# Patient Record
Sex: MALE | Race: BLACK OR AFRICAN AMERICAN | NOT HISPANIC OR LATINO
[De-identification: names, ages, dates, MRNs, and addresses within clinical notes are randomized per-mention and may not be internally consistent; named-entity substitution may affect disease eponyms.]

---

## 2019-08-26 ENCOUNTER — APPOINTMENT (OUTPATIENT)
Dept: NEUROLOGY | Facility: CLINIC | Age: 39
End: 2019-08-26

## 2019-09-11 ENCOUNTER — INPATIENT (INPATIENT)
Facility: HOSPITAL | Age: 39
LOS: 1 days | Discharge: HOME | DRG: 101 | End: 2019-09-13
Attending: PSYCHIATRY & NEUROLOGY | Admitting: PSYCHIATRY & NEUROLOGY
Payer: COMMERCIAL

## 2019-09-11 ENCOUNTER — APPOINTMENT (OUTPATIENT)
Dept: NEUROLOGY | Facility: CLINIC | Age: 39
End: 2019-09-11

## 2019-09-11 VITALS
WEIGHT: 315 LBS | SYSTOLIC BLOOD PRESSURE: 123 MMHG | RESPIRATION RATE: 18 BRPM | HEIGHT: 71 IN | HEART RATE: 86 BPM | TEMPERATURE: 98 F | DIASTOLIC BLOOD PRESSURE: 92 MMHG | OXYGEN SATURATION: 98 %

## 2019-09-11 DIAGNOSIS — R56.9 UNSPECIFIED CONVULSIONS: ICD-10-CM

## 2019-09-11 LAB
ALBUMIN SERPL ELPH-MCNC: 4.2 G/DL — SIGNIFICANT CHANGE UP (ref 3.3–5)
ALP SERPL-CCNC: 45 U/L — SIGNIFICANT CHANGE UP (ref 40–120)
ALT FLD-CCNC: 31 U/L — SIGNIFICANT CHANGE UP (ref 10–45)
ANION GAP SERPL CALC-SCNC: 11 MMOL/L — SIGNIFICANT CHANGE UP (ref 5–17)
AST SERPL-CCNC: 24 U/L — SIGNIFICANT CHANGE UP (ref 10–40)
BASOPHILS # BLD AUTO: 0.1 K/UL — SIGNIFICANT CHANGE UP (ref 0–0.2)
BASOPHILS NFR BLD AUTO: 1 % — SIGNIFICANT CHANGE UP (ref 0–2)
BILIRUB SERPL-MCNC: 0.2 MG/DL — SIGNIFICANT CHANGE UP (ref 0.2–1.2)
BUN SERPL-MCNC: 11 MG/DL — SIGNIFICANT CHANGE UP (ref 7–23)
CALCIUM SERPL-MCNC: 9.9 MG/DL — SIGNIFICANT CHANGE UP (ref 8.4–10.5)
CHLORIDE SERPL-SCNC: 103 MMOL/L — SIGNIFICANT CHANGE UP (ref 96–108)
CO2 SERPL-SCNC: 24 MMOL/L — SIGNIFICANT CHANGE UP (ref 22–31)
CREAT SERPL-MCNC: 0.99 MG/DL — SIGNIFICANT CHANGE UP (ref 0.5–1.3)
EOSINOPHIL # BLD AUTO: 0.1 K/UL — SIGNIFICANT CHANGE UP (ref 0–0.5)
EOSINOPHIL NFR BLD AUTO: 0.8 % — SIGNIFICANT CHANGE UP (ref 0–6)
GLUCOSE SERPL-MCNC: 100 MG/DL — HIGH (ref 70–99)
HCT VFR BLD CALC: 42.5 % — SIGNIFICANT CHANGE UP (ref 39–50)
HGB BLD-MCNC: 13.8 G/DL — SIGNIFICANT CHANGE UP (ref 13–17)
LYMPHOCYTES # BLD AUTO: 2.2 K/UL — SIGNIFICANT CHANGE UP (ref 1–3.3)
LYMPHOCYTES # BLD AUTO: 27.6 % — SIGNIFICANT CHANGE UP (ref 13–44)
MCHC RBC-ENTMCNC: 27.5 PG — SIGNIFICANT CHANGE UP (ref 27–34)
MCHC RBC-ENTMCNC: 32.5 GM/DL — SIGNIFICANT CHANGE UP (ref 32–36)
MCV RBC AUTO: 84.5 FL — SIGNIFICANT CHANGE UP (ref 80–100)
MONOCYTES # BLD AUTO: 0.6 K/UL — SIGNIFICANT CHANGE UP (ref 0–0.9)
MONOCYTES NFR BLD AUTO: 7.9 % — SIGNIFICANT CHANGE UP (ref 2–14)
NEUTROPHILS # BLD AUTO: 4.9 K/UL — SIGNIFICANT CHANGE UP (ref 1.8–7.4)
NEUTROPHILS NFR BLD AUTO: 62.7 % — SIGNIFICANT CHANGE UP (ref 43–77)
PLATELET # BLD AUTO: 252 K/UL — SIGNIFICANT CHANGE UP (ref 150–400)
POTASSIUM SERPL-MCNC: 4.3 MMOL/L — SIGNIFICANT CHANGE UP (ref 3.5–5.3)
POTASSIUM SERPL-SCNC: 4.3 MMOL/L — SIGNIFICANT CHANGE UP (ref 3.5–5.3)
PROT SERPL-MCNC: 7 G/DL — SIGNIFICANT CHANGE UP (ref 6–8.3)
RBC # BLD: 5.03 M/UL — SIGNIFICANT CHANGE UP (ref 4.2–5.8)
RBC # FLD: 13.3 % — SIGNIFICANT CHANGE UP (ref 10.3–14.5)
SODIUM SERPL-SCNC: 138 MMOL/L — SIGNIFICANT CHANGE UP (ref 135–145)
WBC # BLD: 7.8 K/UL — SIGNIFICANT CHANGE UP (ref 3.8–10.5)
WBC # FLD AUTO: 7.8 K/UL — SIGNIFICANT CHANGE UP (ref 3.8–10.5)

## 2019-09-11 PROCEDURE — 95816 EEG AWAKE AND DROWSY: CPT | Mod: 26

## 2019-09-11 PROCEDURE — 99285 EMERGENCY DEPT VISIT HI MDM: CPT

## 2019-09-11 PROCEDURE — 73030 X-RAY EXAM OF SHOULDER: CPT | Mod: 26,RT

## 2019-09-11 PROCEDURE — 99223 1ST HOSP IP/OBS HIGH 75: CPT

## 2019-09-11 RX ORDER — ACETAMINOPHEN 500 MG
975 TABLET ORAL ONCE
Refills: 0 | Status: COMPLETED | OUTPATIENT
Start: 2019-09-11 | End: 2019-09-12

## 2019-09-11 RX ORDER — ENOXAPARIN SODIUM 100 MG/ML
40 INJECTION SUBCUTANEOUS DAILY
Refills: 0 | Status: DISCONTINUED | OUTPATIENT
Start: 2019-09-11 | End: 2019-09-13

## 2019-09-11 NOTE — ED ADULT NURSE REASSESSMENT NOTE - NS ED NURSE REASSESS COMMENT FT1
Order received for tylenol, pt states HA is currently improving, does not want tylenol at this time.

## 2019-09-11 NOTE — H&P ADULT - ASSESSMENT
Assessment: 39 year old M with a PMH of a two seizures in August presents with a witnessed seizure that occurred at 2:15 am on 9/11.  Physical exam significant for left lateral tongue lacerations.     Impression: Right head tilt and right gaze deviation secondary to focal seizure with secondary generalization likely due to left brain dysfunction    Plan:  Imaging:  MRI brain epilepsy protocol if cannot obtain the MRI by 12 pm on 9/11, then CTH w/o contrast should be performed.    Labs:  CBC, CMP, Magnesium, Urine/serum toxicology    Medications:  Ativan 2 mg prn for seizure >3 minutes  Consider starting AED    Other:  VEEG monitoring  Right shoulder X-ray  Telemetry monitoring  Patient would benefit from sleep study outpatient    Disposition:   Admit to EMU    Case discussed with epilepsy attending, Dr. Clayton Assessment: 39 year old M with a PMH of a two seizures in August presents with a witnessed seizure that occurred at 2:15 am on 9/11.  Physical exam significant for left lateral tongue lacerations.     Impression: Right head tilt and right gaze deviation with generalized tonic clonic movements of upper/lower extremities secondary to focal seizure with secondary generalization likely due to left brain dysfunction    Plan:  Imaging:  MRI brain epilepsy protocol if cannot obtain the MRI by 12 pm on 9/11, then CTH w/o contrast should be performed.    Labs:  CBC, CMP, Magnesium, Urine/serum toxicology    Medications:  Ativan 2 mg prn for seizure >3 minutes  Consider starting AED    Other:  VEEG monitoring  Right shoulder X-ray  Telemetry monitoring  Patient would benefit from sleep study outpatient    Disposition:   Admit to EMU    Case discussed with epilepsy attending, Dr. Clayton Assessment: 39 year old M with a PMH of a two seizures in August presents with a witnessed seizure that occurred at 2:15 am on 9/11.  Physical exam significant for left lateral tongue lacerations.     Impression: Right head tilt and right gaze deviation with generalized tonic clonic movements of upper/lower extremities secondary to focal seizure with secondary generalization likely due to left brain dysfunction    Plan:  Imaging:  MRI brain epilepsy protocol if cannot obtain the MRI by 12 pm on 9/11, then CTH w/o contrast should be performed.    Labs:  CBC, CMP, Magnesium, Urine/serum toxicology    Medications:  Ativan 2 mg prn for seizure >3 minutes  Consider starting AED  DVT ppx    Other:  VEEG monitoring  Right shoulder X-ray  SLP consult for dysphagia  Telemetry monitoring  Patient would benefit from sleep study outpatient    Disposition:   Admit to EMU    Case discussed with epilepsy attending, Dr. Clayton

## 2019-09-11 NOTE — H&P ADULT - NSHPLABSRESULTS_GEN_ALL_CORE
LABORATORY:  CBC                       13.8   7.8   )-----------( 252      ( 11 Sep 2019 06:14 )             42.5     Chem 09-11    138  |  103  |  11  ----------------------------<  100<H>  4.3   |  24  |  0.99    Ca    9.9      11 Sep 2019 06:14    TPro  7.0  /  Alb  4.2  /  TBili  0.2  /  DBili  x   /  AST  24  /  ALT  31  /  AlkPhos  45  09-11    LFTs LIVER FUNCTIONS - ( 11 Sep 2019 06:14 )  Alb: 4.2 g/dL / Pro: 7.0 g/dL / ALK PHOS: 45 U/L / ALT: 31 U/L / AST: 24 U/L / GGT: x           Coagulopathy   Lipid Panel   A1c   Cardiac enzymes     U/A   CSF  Immunological  Other    STUDIES & IMAGING:  Studies (EKG, EEG, EMG, etc):     Radiology (XR, CT, MR, U/S, TTE/ALENA):

## 2019-09-11 NOTE — ED PROVIDER NOTE - ATTENDING CONTRIBUTION TO CARE
Patient is a 40 yo M here for evaluation of witnessed seizure. Patient reports a first time seizure on 8/16/19 with subsequent evaluation at [facility] and neurology follow up. He states he had a CT Head which was negative, followed with neurology and had outpatient testing scheduled. He has an EEG scheduled for 11 AM today. Patient's partner has a video of his seizure, seen to be having jerking movements. + tongue biting and urinary incontinence. Denies fevers, chills, vomiting. He was not started on seizure medication. He reports being very tired last night, possibly not sleeping enough.     VS noted  Gen. no acute distress, Non toxic   HEENT: EOMI, PERRL, mmm, tongue bite to lateral left tongue, pharynx w/o erythema, atraumatic  Lungs: CTAB/L no C/ W /R   CVS: RRR   Abd; Soft non tender, non distended   Ext: no edema  Skin: no rash  Neuro AAOx3, CN 2-12 intact, strength equal in UE/LE, gait normal, non focal clear speech  a/p: seizure, not on medications. Plan for labs, neurology evaluation.   - Jazmyne MARK

## 2019-09-11 NOTE — H&P ADULT - NSHPSOCIALHISTORY_GEN_ALL_CORE
SOCIAL HISTORY:   T/E/D: denies tobacco use, drinks alcohol socially   Occupation: not currently employed.

## 2019-09-11 NOTE — ED ADULT NURSE NOTE - CHPI ED NUR SYMPTOMS NEG
no blurred vision/no vomiting/no fever/no nausea/no numbness/no weakness/no loss of consciousness/no change in level of consciousness/no confusion/no dizziness

## 2019-09-11 NOTE — ED PROVIDER NOTE - CLINICAL SUMMARY MEDICAL DECISION MAKING FREE TEXT BOX
Pt p/w seizure, had first time seizure last mo., currently returned to baseline, pt scheduled for EEG o/p today so will not give antiepileptic load at this time. No indicated for CT at this time, pt had normal CT last month. Likely recommed dc for o/p EEG scheduled today -Triston Pt p/w seizure, had first time seizure last mo., currently returned to baseline, pt scheduled for EEG o/p today so will not give antiepileptic load at this time. No indicated for CT at this time, pt had normal CT last month. Seen by neurology who recommended admission.

## 2019-09-11 NOTE — ED ADULT NURSE NOTE - CHIEF COMPLAINT QUOTE
Pt c/o seizure.  Pt has seizure at 215 AM while in house, girlfriend witnessed.  pt had first seizure 8/16/19, scheduled for EEG today.  Pt bit his tongue and urinated himself.

## 2019-09-11 NOTE — ED ADULT NURSE NOTE - NSIMPLEMENTINTERV_GEN_ALL_ED
Implemented All Universal Safety Interventions:  [place] to call system. Call bell, personal items and telephone within reach. Instruct patient to call for assistance. Room bathroom lighting operational. Non-slip footwear when patient is off stretcher. Physically safe environment: no spills, clutter or unnecessary equipment. Stretcher in lowest position, wheels locked, appropriate side rails in place.

## 2019-09-11 NOTE — ED PROVIDER NOTE - PROGRESS NOTE DETAILS
Triston, pgy3: pt requesting neuro consult for eval Gaurav MARK: neurology at bedside evaluation patient. Gaurav MARK: neurology at bedside evaluating patient. Corinne MARK: case discussed with neurology who accepts patient for admission.

## 2019-09-11 NOTE — ED ADULT NURSE NOTE - OBJECTIVE STATEMENT
38 yo M presents to ED from home c/o seizure. Pt girlfriend at bedside states she witnessed seizure, pt was sleeping, noted to be "shaking violently in his sleep". Pt reportedly bit his tongue and was incontinent of urine during episode. Girlfriend states seizure lasted approx 1 minute, pt was postictal for approx 20-25 mins after. Pt currently states he feels "okay but sleepy". Girlfriend denies pt falling or hitting head during event. PERRL. Denies vision changes. Pt had seizure for first time 8/2019, scheduled at 11am today for EEG out pt. Pt denies any CP, SOB, N/V, fever, chills, urinary complaints, constipation, diarrhea, HA, dizziness, weakness. Pt A&Ox4, lungs CTA, +central pulses. Abdomen soft, not tender, not distended. Ambulating w/ steady gait, safety and comfort maintained, no acute distress noted at this time.

## 2019-09-11 NOTE — ED PROVIDER NOTE - OBJECTIVE STATEMENT
39y m PMhx seizures, first seizure on 8/16/19, received normal CT scan at the time and scheduled for EEG today a 11AM, p/w witnessed seizure while sleeping. Pt seen by girlfriend jerking violently in his sleep. Pt now awake and fully A&O, states only he "feels drowsy". Pt denies n/v/d, denies fevers/chills, denies recent illness, denies head trauma.

## 2019-09-11 NOTE — H&P ADULT - NSHPREVIEWOFSYSTEMS_GEN_ALL_CORE
REVIEW OF SYSTEMS    A 10-system ROS was performed and is negative except for those items noted above and/or in the HPI.

## 2019-09-11 NOTE — H&P ADULT - ATTENDING COMMENTS
agree with history as above.  in summary overweight patient with second nocturnal convulsive seizure in 3 weeks. ay very high risk of seizure recurrence/ SUDEP.  plan: admit to EMU  Ativan prn for seizures longer then 3 min  VEEG monitoring  head CT normal

## 2019-09-11 NOTE — ED ADULT NURSE REASSESSMENT NOTE - NS ED NURSE REASSESS COMMENT FT1
Received report from previous shift RN. Patient resting comfortably in bed, reporting improvement in HA. Patient denies current change in vision, numbness, tingling, weakness, n/v. Patient resting comfortably with no acute distress noted, aware of plan of care for neuro eval. Received report from previous shift RN. Patient resting comfortably in bed, reporting improvement in HA. Patient denies current change in vision, numbness, tingling, weakness, n/v. Patient resting comfortably with no acute distress noted, aware of plan of care for neuro eval. Per girlfriend at bedside, patient has EEG appointment at 11am in [place]. MD Nolasco aware of plan for apt today and will reach out to neuro

## 2019-09-11 NOTE — H&P ADULT - HISTORY OF PRESENT ILLNESS
HPI: 39 year old M with a PMH of a two seizures in August presents with a witnessed seizure that occurred at 2:15 am on 9/11. Patient's significant other witnessed the episode and recorded a video as per the patient's neurologist's instructions and called 911. The episode was preceded by a grunting sound and was described as shaking of the upper and lower extremities with right gaze deviation and right head tilt that lasted for about 1 minute. The witness noticed that the patient had blood in his mouth and had urinary incontinence. She said that the patient did not respond to verbal stimuli for 25 minutes after the shaking stopped. When EMS arrived the patient was unable to say the date and was conversational, but unable to hold an appropriate conversation. The patient does not remember the event. The patient stated that on 9/10 he was feeling more tired than usual. The patient's first seizure was also witnessed by his significant other and it occurred on August 16 at 3:30 am. It lasted for one minute and had a similar presentation. It was then followed by a second seizure five minutes later. He was hitting his head on the nightstand and eventually fell of the bed. He has had right shoulder pain since then. The patient had chest tightness that preceded the seizure in August as well as the seizure that occurred on 9/11. He states that his right shoulder has some pain.  He denies alcohol or illicit drug use. He has dysphagia, odynophagia, and numbness/tingling when laying on left side. He denies head trauma during the most recent episode, headache, recent illness, or vision changes. He denies staring spells, or change in taste or smell. His neurologist is Dr. Spencer from [facility] and he was supposed to have a routine EEG today and was planning on getting an MRI and sleep study. Patient's neurologist believes that the patient may have sleep apnea, which could be a trigger for the seizures. Patient's mother had multiple seizure episodes from when she was an infant until she was 12 years old

## 2019-09-11 NOTE — H&P ADULT - NSHPPHYSICALEXAM_GEN_ALL_CORE
VITALS & EXAMINATION:  Vital Signs Last 24 Hrs  T(C): 36.8 (11 Sep 2019 04:18), Max: 36.8 (11 Sep 2019 04:18)  T(F): 98.2 (11 Sep 2019 04:18), Max: 98.2 (11 Sep 2019 04:18)  HR: 68 (11 Sep 2019 06:14) (68 - 86)  BP: 133/88 (11 Sep 2019 06:14) (123/92 - 133/88)  BP(mean): --  RR: 16 (11 Sep 2019 06:14) (16 - 18)  SpO2: 98% (11 Sep 2019 06:14) (98% - 98%)    General:  Constitutional: Obese Male, appears stated age, in no apparent distress including pain    Neurological (>12):  MS: Awake, appears somnolent initially but alert as exam progressed, oriented to person, place, situation, time. Normal affect. Follows all commands.    Language: Speech is clear, fluent with good repetition & comprehension (able to name objects)    CNs: PERRL (R = 3mm, L = 3mm). VFF. EOMI no nystagmus, no diplopia. V1-3 intact to LT, No facial asymmetry b/l, full eye closure strength b/l. Hearing grossly normal (rubbing fingers) b/l. Symmetric palate elevation in midline. Gag reflex deferred. Head turning & shoulder shrug intact b/l. Tongue midline, normal movements, no atrophy. Left lateral tongue has lacerations    Motor: Normal muscle bulk & tone. No noticeable tremor or seizure. No pronator drift.              Deltoid	Biceps	Triceps	Wrist	Finger ABd	   R	5	5	5	5	5		5 	  L	5	5	5	5	5		5    	H-Flex	H-Ext	H-ABd	H-ADd	K-Flex	K-Ext	D-Flex	P-Flex  R	5	5	5	5	5	5	5	5 	   L	5	5	5	5	5	5	5	5	     Sensation: Intact to LT b/l throughout.     Cortical: Extinction on DSS (neglect): none  Reflexes:              Biceps(C5)       BR(C6)     Triceps(C7)               Patellar(L4)    Achilles(S1)    Plantar Resp  R	2	          2	             2		        3		    2		Down   L	2	          2	             2		        3		    2		Down     Coordination: No dysmetria to FTN/HTS    Gait: No postural instability. Normal stance and gait.

## 2019-09-11 NOTE — ED ADULT TRIAGE NOTE - CHIEF COMPLAINT QUOTE
Pt c/o seizure.  Pt has seizure at 215 AM while in house, girlfriend witnessed.  pt had first seizure 8/16.  Pt bit his tongue and urinated himself. Pt c/o seizure.  Pt has seizure at 215 AM while in house, girlfriend witnessed.  pt had first seizure 8/16/19, scheduled for EEG today.  Pt bit his tongue and urinated himself.

## 2019-09-12 DIAGNOSIS — R56.9 UNSPECIFIED CONVULSIONS: ICD-10-CM

## 2019-09-12 PROCEDURE — 70551 MRI BRAIN STEM W/O DYE: CPT | Mod: 26

## 2019-09-12 PROCEDURE — 95951: CPT | Mod: 26

## 2019-09-12 PROCEDURE — 93010 ELECTROCARDIOGRAM REPORT: CPT

## 2019-09-12 PROCEDURE — 99233 SBSQ HOSP IP/OBS HIGH 50: CPT

## 2019-09-12 RX ADMIN — Medication 975 MILLIGRAM(S): at 21:13

## 2019-09-12 RX ADMIN — ENOXAPARIN SODIUM 40 MILLIGRAM(S): 100 INJECTION SUBCUTANEOUS at 12:01

## 2019-09-12 RX ADMIN — Medication 975 MILLIGRAM(S): at 21:52

## 2019-09-12 NOTE — CONSULT NOTE ADULT - SUBJECTIVE AND OBJECTIVE BOX
CHIEF COMPLAINT: Seizures     HISTORY OF PRESENT ILLNESS:  39 year old M with a PMH of a two seizures in August presents with a witnessed seizure that occurred at 2:15 am on 9/11. Patient's significant other witnessed the episode and recorded a video as per the patient's neurologist's instructions and called 911. The episode was preceded by a grunting sound and was described as shaking of the upper and lower extremities with right gaze deviation and right head tilt that lasted for about 1 minute. The witness noticed that the patient had blood in his mouth and had urinary incontinence. She said that the patient did not respond to verbal stimuli for 25 minutes after the shaking stopped. When EMS arrived the patient was unable to say the date and was conversational, but unable to hold an appropriate conversation. The patient does not remember the event. The patient stated that on 9/10 he was feeling more tired than usual. The patient's first seizure was also witnessed by his significant other and it occurred on August 16 at 3:30 am. It lasted for one minute and had a similar presentation. It was then followed by a second seizure five minutes later. He was hitting his head on the nightstand and eventually fell of the bed. He has had right shoulder pain since then. The patient had chest tightness that preceded the seizure in August as well as the seizure that occurred on 9/11. He states that his right shoulder has some pain.  He denies alcohol or illicit drug use. He has dysphagia, odynophagia, and numbness/tingling when laying on left side. He denies head trauma during the most recent episode, headache, recent illness, or vision changes. He denies staring spells, or change in taste or smell. His neurologist is Dr. Spencer from [facility] and he was supposed to have a routine EEG today and was planning on getting an MRI and sleep study. Patient's neurologist believes that the patient may have sleep apnea, which could be a trigger for the seizures. Patient's mother had multiple seizure episodes from when she was an infant until she was 12 years old    Found to have possible Afib on Tele overnight.   No known history of cardiac disease. NO chest pain, shortness of breath or palpitations.     PAST MEDICAL & SURGICAL HISTORY:          MEDICATIONS:  enoxaparin Injectable 40 milliGRAM(s) SubCutaneous daily        acetaminophen   Tablet .. 975 milliGRAM(s) Oral once  LORazepam   Injectable 2 milliGRAM(s) IV Push once PRN            FAMILY HISTORY:      SOCIAL HISTORY:    [ ] Non-smoker  [ ] Smoker  [ ] Alcohol    Allergies    Drug Allergies Not Recorded  pt allergic to grapefruit. (Anaphylaxis)    Intolerances    	    REVIEW OF SYSTEMS:  CONSTITUTIONAL: No fever, weight loss, + fatigue  EYES: No eye pain, visual disturbances, or discharge  ENMT:  No difficulty hearing, tinnitus, vertigo; No sinus or throat pain  NECK: No pain or stiffness  RESPIRATORY: No cough, wheezing, chills or hemoptysis; No Shortness of Breath  CARDIOVASCULAR: No chest pain, palpitations, passing out, dizziness, or leg swelling  GASTROINTESTINAL: No abdominal or epigastric pain. No nausea, vomiting, or hematemesis; No diarrhea or constipation. No melena or hematochezia.  GENITOURINARY: No dysuria, frequency, hematuria, or incontinence  NEUROLOGICAL: No headaches, memory loss, loss of strength, numbness, or tremors  SKIN: No itching, burning, rashes, or lesions   LYMPH Nodes: No enlarged glands  ENDOCRINE: No heat or cold intolerance; No hair loss  MUSCULOSKELETAL: No joint pain or swelling; No muscle, back, or extremity pain  PSYCHIATRIC: No depression, anxiety, mood swings, or difficulty sleeping  HEME/LYMPH: No easy bruising, or bleeding gums  ALLERY AND IMMUNOLOGIC: No hives or eczema	    [ ] All others negative	  [ ] Unable to obtain    PHYSICAL EXAM:  T(C): 36.8 (09-12-19 @ 10:00), Max: 37.2 (09-11-19 @ 23:30)  HR: 70 (09-12-19 @ 10:00) (62 - 84)  BP: 129/82 (09-12-19 @ 10:00) (105/80 - 136/80)  RR: 18 (09-12-19 @ 10:00) (18 - 19)  SpO2: 96% (09-12-19 @ 10:00) (96% - 99%)  Wt(kg): --  I&O's Summary      Appearance: Obese, EEG in progress 	  HEENT:   Normal oral mucosa, PERRL, EOMI	  Lymphatic: No lymphadenopathy  Cardiovascular: Normal S1 S2, No JVD, No murmurs, No edema  Respiratory: Lungs clear to auscultation	  Psychiatry: A & O x 3, Mood & affect appropriate  Gastrointestinal:  Soft, Non-tender, + BS	  Skin: No rashes, No ecchymoses, No cyanosis	  Extremities: Normal range of motion, No clubbing, cyanosis or edema  Vascular: Peripheral pulses palpable 2+ bilaterally  Neurological (>12):  	MS: Awake, appears somnolent initially but alert as exam progressed, oriented to person, place, situation, time. Normal affect. Follows all commands.    	Language: Speech is clear, fluent with good repetition & comprehension (able to name objects)    	CNs: PERRL (R = 3mm, L = 3mm). VFF. EOMI no nystagmus, no diplopia. V1-3 intact to LT, No facial asymmetry b/l, full eye closure strength b/l. Hearing grossly normal (rubbing fingers) b/l. Symmetric palate elevation in midline. Gag reflex deferred. Head turning & shoulder shrug intact b/l. Tongue midline, normal movements, no atrophy. Left lateral tongue has lacerations    	Motor: Normal muscle bulk & tone. No noticeable tremor or seizure. No pronator drift.  	            Deltoid	Biceps	Triceps	Wrist	Finger ABd	   	R	5	5	5	5	5		5 	  	L	5	5	5	5	5		5    		H-Flex	H-Ext	H-ABd	H-ADd	K-Flex	K-Ext	D-Flex	P-Flex  	R	5	5	5	5	5	5	5	5 	   	L	5	5	5	5	5	5	5	5	     	Sensation: Intact to LT b/l throughout.     	Cortical: Extinction on DSS (neglect): none  	Reflexes:  	            Biceps(C5)       BR(C6)     Triceps(C7)               Patellar(L4)    Achilles(S1)    Plantar Resp  	R	2	          2	             2		        3		    2		Down   	L	2	          2	             2		        3		    2		Down     	Coordination: No dysmetria to FTN/HTS    	Gait: No postural instability. Normal stance and gait.  TELEMETRY: 	  SR, Artifact, Sinus arrhythmia   ECG:  	  RADIOLOGY:  < from: Xray Shoulder 2 Views, Right (09.11.19 @ 10:00) >    EXAM:  SHOULDER RIGHT (MINIMUM 2 VIEWS)                            PROCEDURE DATE:  09/11/2019            INTERPRETATION:      CLINICAL INDICATION: Right shoulder pain and limited range of motion   status post seizure.  TECHNIQUE: AP internal rotation, AP external rotation, and scapular Y   views of the right shoulder.    COMPARISON: None available.    FINDINGS:    No acute fracture or dislocation. Glenohumeral cartilage space is   maintained. Mild AC joint arthropathy. No abnormal soft tissue swelling   or mineralization. Imaged portions of the lung are clear.        IMPRESSION:  No acute fracture or dislocation.                    MARCEL RUTLEDGE M.D., ATTENDING RADIOLOGIST  This document has been electronically signed. Sep 11 2019 10:57AM                   OTHER: 	  	  LABS:	 	    CARDIAC MARKERS:                                  13.8   7.8   )-----------( 252      ( 11 Sep 2019 06:14 )             42.5     09-11    138  |  103  |  11  ----------------------------<  100<H>  4.3   |  24  |  0.99    Ca    9.9      11 Sep 2019 06:14    TPro  7.0  /  Alb  4.2  /  TBili  0.2  /  DBili  x   /  AST  24  /  ALT  31  /  AlkPhos  45  09-11    proBNP:   Lipid Profile:   HgA1c:   TSH:

## 2019-09-12 NOTE — PROGRESS NOTE ADULT - SUBJECTIVE AND OBJECTIVE BOX
Subjective: No events. No complaints. Denies nocturnal seizures    MEDICATIONS  (STANDING):  acetaminophen   Tablet .. 975 milliGRAM(s) Oral once  enoxaparin Injectable 40 milliGRAM(s) SubCutaneous daily    MEDICATIONS  (PRN):  LORazepam   Injectable 2 milliGRAM(s) IV Push once PRN Seizure activity    Vital Signs Last 24 Hrs  T(C): 36.7 (12 Sep 2019 13:21), Max: 37.2 (11 Sep 2019 23:30)  T(F): 98 (12 Sep 2019 13:21), Max: 98.9 (11 Sep 2019 23:30)  HR: 72 (12 Sep 2019 13:21) (62 - 84)  BP: 132/84 (12 Sep 2019 13:21) (105/80 - 136/80)  RR: 18 (12 Sep 2019 13:21) (18 - 19)  SpO2: 96% (12 Sep 2019 13:21) (96% - 97%)    Physical Exam: Neurological Exam:  	Mental Status: Orientated to self, date and place.  Attention intact.  No dysarthria, aphasia or neglect.     	Cranial Nerves: PERRL, EOMI, no nystagmus or diplopia. No facial asymmetry.  Hearing intact to finger rub bilaterally.  Tongue, uvula and palate midline.  Sternocleidomastoid and Trapezius intact bilaterally.    	Motor: moving all 4 extremities equally w 5/5 strength  	Tone: normal.                  	Pronator drift: none                 	Dysmetria: None to finger-nose-finger  	No truncal ataxia.    	Tremor: No resting, postural or action tremor.  No myoclonus.  	Sensation: intact to light touch                           13.8   7.8   )-----------( 252      ( 11 Sep 2019 06:14 )             42.5     09-11    138  |  103  |  11  ----------------------------<  100<H>  4.3   |  24  |  0.99    Ca    9.9      11 Sep 2019 06:14    TPro  7.0  /  Alb  4.2  /  TBili  0.2  /  DBili  x   /  AST  24  /  ALT  31  /  AlkPhos  45  09-11

## 2019-09-12 NOTE — PROGRESS NOTE ADULT - ATTENDING COMMENTS
no clinical events overnight, normal EEG.    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    genetic testing as outpatient  no driving restrictions for 6 months and temporary work change until clinical documentation of nocturnal seizure pattern only.

## 2019-09-12 NOTE — CONSULT NOTE ADULT - ASSESSMENT
39 year old M with a PMH of a two seizures in August presents with a witnessed seizure that occurred at 2:15 am on 9/11.    Right head tilt and right gaze deviation with generalized tonic clonic movements of upper/lower extremities secondary to focal seizure with secondary generalization likely due to left brain dysfunction

## 2019-09-12 NOTE — PROGRESS NOTE ADULT - ASSESSMENT
39 year old M with a PMH of a two nocturnal seizures since August presents with a witnessed seizure that occurred at 2:15 am on 9/11.  Physical exam significant for left lateral tongue lacerations. VEEG recording is a normal study so far.         Plan:  - MRI brain epilepsy protocol scheduled for tonight  - Continue VEEG  - Will likely require sleep study as outpatient  - Likely discharge home tomorrow  Cardiology Consulted for questionable run of Afib on Tele monitor  Ativan 2 mg prn for seizure >3 minutes  Consider starting AED  DVT ppx

## 2019-09-13 ENCOUNTER — TRANSCRIPTION ENCOUNTER (OUTPATIENT)
Age: 39
End: 2019-09-13

## 2019-09-13 ENCOUNTER — RX CHANGE (OUTPATIENT)
Age: 39
End: 2019-09-13

## 2019-09-13 VITALS
DIASTOLIC BLOOD PRESSURE: 72 MMHG | RESPIRATION RATE: 18 BRPM | HEART RATE: 70 BPM | TEMPERATURE: 98 F | SYSTOLIC BLOOD PRESSURE: 128 MMHG | OXYGEN SATURATION: 98 %

## 2019-09-13 PROCEDURE — 95816 EEG AWAKE AND DROWSY: CPT

## 2019-09-13 PROCEDURE — 93005 ELECTROCARDIOGRAM TRACING: CPT

## 2019-09-13 PROCEDURE — 99233 SBSQ HOSP IP/OBS HIGH 50: CPT

## 2019-09-13 PROCEDURE — 95951: CPT

## 2019-09-13 PROCEDURE — 85027 COMPLETE CBC AUTOMATED: CPT

## 2019-09-13 PROCEDURE — 80053 COMPREHEN METABOLIC PANEL: CPT

## 2019-09-13 PROCEDURE — 99285 EMERGENCY DEPT VISIT HI MDM: CPT

## 2019-09-13 PROCEDURE — 73030 X-RAY EXAM OF SHOULDER: CPT

## 2019-09-13 PROCEDURE — 95951: CPT | Mod: 26

## 2019-09-13 PROCEDURE — 70551 MRI BRAIN STEM W/O DYE: CPT

## 2019-09-13 RX ORDER — LEVETIRACETAM 250 MG/1
1 TABLET, FILM COATED ORAL
Qty: 60 | Refills: 0
Start: 2019-09-13 | End: 2019-10-12

## 2019-09-13 RX ORDER — LEVETIRACETAM 250 MG/1
1000 TABLET, FILM COATED ORAL ONCE
Refills: 0 | Status: COMPLETED | OUTPATIENT
Start: 2019-09-13 | End: 2019-09-13

## 2019-09-13 RX ORDER — LEVETIRACETAM 250 MG/1
500 TABLET, FILM COATED ORAL
Refills: 0 | Status: DISCONTINUED | OUTPATIENT
Start: 2019-09-13 | End: 2019-09-13

## 2019-09-13 RX ORDER — LACOSAMIDE 50 MG/1
100 TABLET ORAL
Qty: 6000 | Refills: 4
Start: 2019-09-13 | End: 2020-02-09

## 2019-09-13 RX ADMIN — ENOXAPARIN SODIUM 40 MILLIGRAM(S): 100 INJECTION SUBCUTANEOUS at 11:52

## 2019-09-13 RX ADMIN — LEVETIRACETAM 400 MILLIGRAM(S): 250 TABLET, FILM COATED ORAL at 11:52

## 2019-09-13 NOTE — DISCHARGE NOTE NURSING/CASE MANAGEMENT/SOCIAL WORK - PATIENT PORTAL LINK FT
You can access the FollowMyHealth Patient Portal offered by [facility] by registering at the following website: [email or web address]. By joining [employer]’s FollowMyHealth portal, you will also be able to view your health information using other applications (apps) compatible with our system.

## 2019-09-13 NOTE — DISCHARGE NOTE PROVIDER - HOSPITAL COURSE
39 year old M with a PMH of a two seizures in August presents with a witnessed seizure that occurred at 2:15 am on 9/11. Patient's significant other witnessed the episode and recorded a video as per the patient's neurologist's instructions and called 911. The episode was preceded by a grunting sound and was described as shaking of the upper and lower extremities with right gaze deviation and right head tilt that lasted for about 1 minute. The witness noticed that the patient had blood in his mouth and had urinary incontinence. She said that the patient did not respond to verbal stimuli for 25 minutes after the shaking stopped. When EMS arrived the patient was unable to say the date and was conversational, but unable to hold an appropriate conversation. The patient does not remember the event. The patient stated that on 9/10 he was feeling more tired than usual. The patient's first seizure was also witnessed by his significant other and it occurred on August 16 at 3:30 am. It lasted for one minute and had a similar presentation. It was then followed by a second seizure five minutes later. He was hitting his head on the nightstand and eventually fell of the bed. He has had right shoulder pain since then. The patient had chest tightness that preceded the seizure in August as well as the seizure that occurred on 9/11. He states that his right shoulder has some pain.  He denies alcohol or illicit drug use. He has dysphagia, odynophagia, and numbness/tingling when laying on left side. He denies head trauma during the most recent episode, headache, recent illness, or vision changes. He denies staring spells, or change in taste or smell. His neurologist is Dr. Spencer from [facility] and he was supposed to have a routine EEG today and was planning on getting an MRI and sleep study. Patient's neurologist believes that the patient may have sleep apnea, which could be a trigger for the seizures. Patient's mother had multiple seizure episodes from when she was an infant until she was 12 years old.     Admitted to EMU on 9/11/19 and continuous VEEG was initiated. MRI of brain performed on 9/12/19 was a normal study. VEEG did not capture any clinical seizure events.

## 2019-09-13 NOTE — DISCHARGE NOTE PROVIDER - NSDCFUADDINST_GEN_ALL_CORE_FT
It is recommended that you not drive or operate heavy machinery for the next 6 months while your seizures are further investigated. 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

## 2019-09-13 NOTE — PROGRESS NOTE ADULT - SUBJECTIVE AND OBJECTIVE BOX
Subjective: Patient seen and examined. No new events except as noted.     REVIEW OF SYSTEMS:    CONSTITUTIONAL: + weakness, fevers or chills  EYES/ENT: No visual changes;  No vertigo or throat pain   NECK: No pain or stiffness  RESPIRATORY: No cough, wheezing, hemoptysis; No shortness of breath  CARDIOVASCULAR: No chest pain or palpitations  GASTROINTESTINAL: No abdominal or epigastric pain. No nausea, vomiting, or hematemesis; No diarrhea or constipation. No melena or hematochezia.  GENITOURINARY: No dysuria, frequency or hematuria  NEUROLOGICAL: No numbness or weakness  SKIN: No itching, burning, rashes, or lesions   All other review of systems is negative unless indicated above.    MEDICATIONS:  MEDICATIONS  (STANDING):  enoxaparin Injectable 40 milliGRAM(s) SubCutaneous daily      PHYSICAL EXAM:  T(C): 36.8 (09-13-19 @ 07:55), Max: 37 (09-12-19 @ 15:58)  HR: 70 (09-13-19 @ 07:55) (57 - 88)  BP: 128/72 (09-13-19 @ 07:55) (124/75 - 132/84)  RR: 18 (09-13-19 @ 07:55) (18 - 18)  SpO2: 98% (09-13-19 @ 07:55) (94% - 98%)  Wt(kg): --  I&O's Summary    12 Sep 2019 07:01  -  13 Sep 2019 07:00  --------------------------------------------------------  IN: 600 mL / OUT: 0 mL / NET: 600 mL        Appearance: Obese, EEG in progress 	  HEENT:   Normal oral mucosa, PERRL, EOMI	  Lymphatic: No lymphadenopathy  Cardiovascular: Normal S1 S2, No JVD, No murmurs, No edema  Respiratory: Lungs clear to auscultation	  Psychiatry: A & O x 3, Mood & affect appropriate  Gastrointestinal:  Soft, Non-tender, + BS	  Skin: No rashes, No ecchymoses, No cyanosis	  Extremities: Normal range of motion, No clubbing, cyanosis or edema  Vascular: Peripheral pulses palpable 2+ bilaterally  Neurological (>12):  	MS: Awake, appears somnolent initially but alert as exam progressed, oriented to person, place, situation, time. Normal affect. Follows all commands.    	Language: Speech is clear, fluent with good repetition & comprehension (able to name objects)    	CNs: PERRL (R = 3mm, L = 3mm). VFF. EOMI no nystagmus, no diplopia. V1-3 intact to LT, No facial asymmetry b/l, full eye closure strength b/l. Hearing grossly normal (rubbing fingers) b/l. Symmetric palate elevation in midline. Gag reflex deferred. Head turning & shoulder shrug intact b/l. Tongue midline, normal movements, no atrophy. Left lateral tongue has lacerations    	Motor: Normal muscle bulk & tone. No noticeable tremor or seizure. No pronator drift.  	            Deltoid	Biceps	Triceps	Wrist	Finger ABd	   	R	5	5	5	5	5		5 	  	L	5	5	5	5	5		5    		H-Flex	H-Ext	H-ABd	H-ADd	K-Flex	K-Ext	D-Flex	P-Flex  	R	5	5	5	5	5	5	5	5 	   	L	5	5	5	5	5	5	5	5	     	Sensation: Intact to LT b/l throughout.     	Cortical: Extinction on DSS (neglect): none  	Reflexes:  	            Biceps(C5)       BR(C6)     Triceps(C7)               Patellar(L4)    Achilles(S1)    Plantar Resp  	R	2	          2	             2		        3		    2		Down   	L	2	          2	             2		        3		    2		Down     	Coordination: No dysmetria to FTN/HTS    	Gait: No postural instability. Normal stance and gait.    LABS:    CARDIAC MARKERS:                  proBNP:   Lipid Profile:   HgA1c:   TSH:             TELEMETRY: 	  SR, SB 40s   ECG:  	  RADIOLOGY:   DIAGNOSTIC TESTING:  [ ] Echocardiogram:  [ ]  Catheterization:  [ ] Stress Test:    OTHER:

## 2019-09-13 NOTE — DISCHARGE NOTE PROVIDER - NSDCCPCAREPLAN_GEN_ALL_CORE_FT
PRINCIPAL DISCHARGE DIAGNOSIS  Diagnosis: Seizure  Assessment and Plan of Treatment: Follow up with your Primary Care Physician within the next 2-3 days   Follow up with your Neurologist Dr Clayton within the next 2 weeks  Bring a copy of your test results with you to your appointment  Start taking Vimpat  Contact your Neurologist, Primary Care Provider or report to the Emergency Room if you experience new or worsening symptoms PRINCIPAL DISCHARGE DIAGNOSIS  Diagnosis: Seizure  Assessment and Plan of Treatment: Follow up with your Primary Care Physician within the next 2-3 days   Follow up with your Neurologist Dr Clayton within the next 2 weeks  Bring a copy of your test results with you to your appointment  Start taking Keppra 500mg twice daily  Contact your Neurologist, Primary Care Provider or report to the Emergency Room if you experience new or worsening symptoms

## 2019-09-13 NOTE — SWALLOW BEDSIDE ASSESSMENT ADULT - SWALLOW EVAL: DIAGNOSIS
Consult for bedside swallow evaluation received and appreciated, case d/w MD: Alan who reported swallow evaluation is not indicated at present time. Given above, this service will not actively follow, please reconsult if clinically indicated.

## 2019-09-13 NOTE — DISCHARGE NOTE PROVIDER - CARE PROVIDER_API CALL
Lg Person)  Internal Medicine; Pulmonary Disease; Sleep Medicine  [address], [address]  [place] [address]  Phone: [phone]  Fax: [phone]  Follow Up Time: Routine    David Bazzi ()  Cardiology; Internal Medicine  [address], [address]  [place] [address]  Phone: [phone]  Fax: [phone]  Follow Up Time: Routine    Jody Clayton)  EEGEpilepsy; Neurology  [address] [place] [address], [facility] [address]  [place] [address]  Phone: [phone]  Follow Up Time: 1 week

## 2019-09-13 NOTE — DISCHARGE NOTE PROVIDER - CARE PROVIDERS DIRECT ADDRESSES
[email or web address],DirectAddress_Unknown

## 2019-09-13 NOTE — PROGRESS NOTE ADULT - PROBLEM SELECTOR PLAN 1
Follow up EEG   Orders per EMU team   Tele findings most consistent with artifact. No Afib thus far   Bradycardia likely ERICA related

## 2019-09-13 NOTE — DISCHARGE NOTE NURSING/CASE MANAGEMENT/SOCIAL WORK - NSDCPEPTSTRK_GEN_ALL_CORE
Signs and symptoms of stroke/Call 911 for stroke/Need for follow up after discharge/Stroke education booklet/Prescribed medications/Risk factors for stroke/Stroke support groups for patients, families, and friends/Stroke warning signs and symptoms

## 2019-09-20 ENCOUNTER — APPOINTMENT (OUTPATIENT)
Dept: NEUROLOGY | Facility: CLINIC | Age: 39
End: 2019-09-20
Payer: COMMERCIAL

## 2019-09-20 VITALS
HEIGHT: 71 IN | BODY MASS INDEX: 44.1 KG/M2 | WEIGHT: 315 LBS | HEART RATE: 74 BPM | SYSTOLIC BLOOD PRESSURE: 135 MMHG | DIASTOLIC BLOOD PRESSURE: 99 MMHG

## 2019-09-20 DIAGNOSIS — E66.9 OVERWEIGHT: ICD-10-CM

## 2019-09-20 DIAGNOSIS — E66.3 OVERWEIGHT: ICD-10-CM

## 2019-09-20 PROCEDURE — 99204 OFFICE O/P NEW MOD 45 MIN: CPT

## 2019-09-20 RX ORDER — LACOSAMIDE 100 MG/1
100 TABLET, FILM COATED ORAL
Qty: 60 | Refills: 5 | Status: DISCONTINUED | COMMUNITY
Start: 2019-09-13 | End: 2019-09-20

## 2019-09-23 ENCOUNTER — RX RENEWAL (OUTPATIENT)
Age: 39
End: 2019-09-23

## 2019-10-06 PROBLEM — E66.3 OVERWEIGHT AND OBESITY: Status: RESOLVED | Noted: 2019-10-06 | Resolved: 2019-10-06

## 2019-10-06 NOTE — REASON FOR VISIT
[Post Hospitalization] : a post hospitalization visit [Spouse] : spouse [Initial Eval - Existing Diagnosis] : an initial evaluation of an existing diagnosis

## 2019-10-10 ENCOUNTER — APPOINTMENT (OUTPATIENT)
Dept: NEUROLOGY | Facility: CLINIC | Age: 39
End: 2019-10-10
Payer: COMMERCIAL

## 2019-10-10 VITALS
SYSTOLIC BLOOD PRESSURE: 121 MMHG | BODY MASS INDEX: 44.1 KG/M2 | HEIGHT: 71 IN | DIASTOLIC BLOOD PRESSURE: 84 MMHG | HEART RATE: 70 BPM | WEIGHT: 315 LBS

## 2019-10-10 DIAGNOSIS — F17.200 NICOTINE DEPENDENCE, UNSPECIFIED, UNCOMPLICATED: ICD-10-CM

## 2019-10-10 DIAGNOSIS — R06.83 SNORING: ICD-10-CM

## 2019-10-10 PROCEDURE — 99244 OFF/OP CNSLTJ NEW/EST MOD 40: CPT

## 2019-10-10 NOTE — PHYSICAL EXAM
[FreeTextEntry1] : Examination:\par Constitutional: normal, no apparent distress\par Eyes: normal conjunctiva b/l, no ptosis, visual fields full\par Respiratory: no respiratory distress, normal effort, normal auscultation\par oropharynx: Mallampati 4\par Cardiovascular: normal rate, rhythm, no murmurs\par Neck: supple, no masses\par Vascular: carotids normal\par Skin: normal color, no rashes\par Psych: normal mood, affect\par \par Neurological:\par Memory: normal memory, oriented to person, place, time\par Language intact/no aphasia\par Cranial Nerves: II-XII intact, Pupils equally round and reactive to light, ocular muscles/movements intact, no ptosis, no facial weakness, tongue protrudes normally in the midline, \par Motor: normal tone, no pronator drift, full strength in all four extremities in the proximal and distal muscle groups\par Coordination: Fine motor movements intact, rapid alternating movements intact, finger to nose intact bilaterally\par Sensory: intact to light touch, vibration, joint position sense, negative Romberg examination\par DTRs: symmetric, 2 in b/l triceps, 2 in b/l biceps, 2 in b/l brachioradialis, 2 in bilateral patellars, 2 in bilateral Achilles, Babinskis negative bilaterally\par Gait: narrow based, steady\par

## 2019-10-10 NOTE — DATA REVIEWED
[de-identified] : Polysomnogram performed at [facility] performed on 9/17/19:\par \par No Periodic Limb Movements of Sleep

## 2019-10-10 NOTE — DISCUSSION/SUMMARY
[FreeTextEntry1] : Mr. Lawrence is a 39 year old man with a history of two nocturnal seizures (August and September 2019) who presents for management of obstructive sleep apnea.\par \par His significant other noted signs of ERICA after recent weight gain.\par \par He had a polysomnogram at [facility] on 9/17/19 which showed mild ERICA with an apnea hypopnea index of 13.\par \par We discussed the risks of untreated sleep apnea including sleepiness, hypertension, increased risk for heart disease and stroke, worsening seizure frequency and cognitive impairment.\par \par We discussed different treatments for ERICA including CPAP, oral appliance, surgery and weight loss. \par \par I explained that positive airway pressure is the most effective treatment option.\par Since his PSG only showed mild ERICA without any central apnea, he seems to be a candidate for auto PAP which may help expedite him being started on treatment.\par \par I will place in order with a durable medical equipment company.  I told him that if he does not hear from the company within 2 weeks he should call our office.  Once he receives her machine she should follow up one month later to discuss any issues.\par I explained that he can use the PAP in the evening while awake and watching television or relaxation for desensitization.\par \par \par I informed him that insurance companies typically require outpatient follow-up 1-3 months following the start of PAP treatment. I can work with one of his other doctor's to provide the necessary documentation if he is unable to make it to this location since he is not currently driving.\par \par He should call with any questions or concerns. \par \par \par

## 2019-10-10 NOTE — REVIEW OF SYSTEMS
[As Noted in HPI] : as noted in HPI [Recent Weight Gain (___ Lbs)] : recent [unfilled] ~Ulb weight gain [Seizures] : convulsions [Chest Pain] : chest pain [Arthralgias] : arthralgias [Negative] : Genitourinary

## 2019-10-10 NOTE — CONSULT LETTER
[Dear  ___] : Dear  [unfilled], [Consult Letter:] : I had the pleasure of evaluating your patient, [unfilled]. [Please see my note below.] : Please see my note below. [Consult Closing:] : Thank you very much for allowing me to participate in the care of this patient.  If you have any questions, please do not hesitate to contact me. [FreeTextEntry2] : Jody Clayton [FreeTextEntry3] : Sincerely,\par \par \par Flori Cantu MD\par Diplomate, American Academy of Psychiatry and Neurology\par Board Certified in the Subspecialty of Clinical Neurophysiology\par Board Certified in the Subspecialty of Sleep Medicine\par Board Certified in the Subspecialty of Epilepsy\par

## 2019-10-28 ENCOUNTER — RX RENEWAL (OUTPATIENT)
Age: 39
End: 2019-10-28

## 2019-11-13 ENCOUNTER — APPOINTMENT (OUTPATIENT)
Dept: NEUROLOGY | Facility: CLINIC | Age: 39
End: 2019-11-13
Payer: COMMERCIAL

## 2019-11-13 VITALS
BODY MASS INDEX: 44.1 KG/M2 | WEIGHT: 315 LBS | SYSTOLIC BLOOD PRESSURE: 131 MMHG | HEART RATE: 74 BPM | DIASTOLIC BLOOD PRESSURE: 87 MMHG | HEIGHT: 71 IN

## 2019-11-13 PROCEDURE — 99214 OFFICE O/P EST MOD 30 MIN: CPT

## 2019-11-13 NOTE — HISTORY OF PRESENT ILLNESS
[FreeTextEntry1] : *** 09/20/2019 ***\par \par Mr. MARIEE is a 39 years old here for initial evaluation post hospitalization for newly diagnosed epilepsy.\par \par He had 2 previous nocturnal seizures 3 weeks apart( last one recorded by his wife) and was initiated on Keppra 500 BID well tolerated. \par his EEG was negative and his brain MRI as well. No events were recorded during his admission.\par He is overweight and has episodes of loud snoring and possible ERICA, for which he already underwent a polysomnography, results are pending.\par he had his sleep study done 2 weeks prior at [facility], results pending.\par No side effects from medication, no seizures.\par \par *** from prior hospitalization ***\par HPI: 39 year old M with a PMH of a two seizures in August presents with a witnessed seizure that occurred at 2:15 am on 9/11. Patient's significant other witnessed the episode and recorded a video as per the patient's neurologist's instructions and called 911. The episode was preceded by a grunting sound and was described as shaking of the upper and lower extremities with right gaze deviation and right head tilt that lasted for about 1 minute. The witness noticed that the patient had blood in his mouth and had urinary incontinence. She said that the patient did not respond to verbal stimuli for 25 minutes\par after the shaking stopped. When EMS arrived the patient was unable to say the date and was conversational, but unable to hold an appropriate conversation. The patient does not remember the event. The patient stated that on 9/10 he was feeling more tired than usual. The patient's first seizure was also witnessed by his significant other and it occurred on August 16 at 3:30 am. It lasted for one minute and had a similar presentation. It was then followed by a second seizure five minutes later. He was hitting his head on the nightstand and eventually fell of the bed. He has had right shoulder pain since then. The\par patient had chest tightness that preceded the seizure in August as well as the seizure that occurred on 9/11. He states that his right shoulder has some pain.\par  He denies alcohol or illicit drug use. He has dysphagia, odynophagia, and numbness/tingling when laying on left side. He denies head trauma during the most recent episode, headache, recent illness, or vision changes. He denies staring spells, or change in taste or smell. His neurologist is Dr. Spencer from [facility] and he was supposed to have a routine EEG today and was planning on getting an MRI and sleep study. Patient's neurologist believes that the patient may have sleep apnea, which could be a trigger for the seizures. Patient's mother had multiple seizure episodes from when she was an infant until she was\par 12 years old

## 2019-11-13 NOTE — DISCUSSION/SUMMARY
[Complex Partial] : complex partial [Idiopathic] : idiopathic  [Risks Associated with Driving/[place] Law] : As per my usual protocol, the patient was advised in regards to risks and driving privileges associated with the [place] Guidelines.  [Medication Side Effects] : High frequency and serious potential medication adverse effects were reviewed with the patient, including but not exclusive to psychiatric effects.  Information sheets on medication side effects were made available to the patient in our clinic.  The patient or advocate agrees to notify us for any concerns. [Sleep Hygiene/Sleep Disruption Risks] : Sleep hygiene and the risks of sleep disruption were discussed. [FreeTextEntry1] : Mr. MARIEE is a 39 years old overweight patient with newly diagnosed nocturnal epilepsy and probable ERICA (polysomnography results pending)\par \par Plan: \par 1. Increase keppra to 750 XR mg BID\par 2. F/u polysomnography and consider sleep  evaluation\par 3. No driving restriction for 1 year seizure free but this assessment will be modified in 6 months if his events will be exclusively nocturnal.\par 4. He can return to work but not in hazardous conditions for the same reasons as above.\par 4. f/u in 3 months\par \par

## 2019-11-13 NOTE — REASON FOR VISIT
[Follow-Up: _____] : a [unfilled] follow-up visit [Spouse] : spouse [Initial Eval - Existing Diagnosis] : an initial evaluation of an existing diagnosis

## 2019-11-13 NOTE — PHYSICAL EXAM
[FreeTextEntry1] : MS: alert & oriented to person, place time, good fund of knowledge and recall for recent and remote events. Responses appropriate \par \par CN: Optic disks sharp bilaterally, venous pulsations not well visuallized, VFF to confrontation, EOM full without nystagmus, smooth pursuit without saccadic intrusions, PERRL, V1-V3 intact to light touch, face symmetrical, hears finger rub bilaterally, palate elevates symmetrically, shoulders elevate symmetrically, tongue midline\par \par MOTOR: normal tone x 4 extremities, Power 5/5 proximally and distally bilaterally, no drift, normal rapid alternating movements. \par \par SENSORY: intact LT x 4 extremities\par \par REFLEXES: biceps 2+ bilaterally, triceps 2+ bilaterally, brachioradialis 2+ bilaterally, patella 2+ bilaterally, ankle 2+ bilaterally, plantar flexor bilaterally\par \par COORD: normal FNF, no tremor or dysmetria\par \par GAIT: normal base, Romberg negative, normal gait, able to walk on toes, heels and tandem normal.\par \par Cervical Spine: \par    ROM: FULL  \par    Tenderness: NONE \par

## 2019-11-13 NOTE — HISTORY OF PRESENT ILLNESS
[FreeTextEntry1] : *** 09/20/2019 ***\par \par Mr. MARIEE is a 39 years old here for initial evaluation post hospitalization for newly diagnosed epilepsy.\par \par He had 2 previous nocturnal seizures 3 weeks apart( last one recorded by his wife) and was initiated on Keppra 500 BID well tolerated. \par his EEG was negative and his brain MRI as well. No events were recorded during his admission.\par He is overweight and has episodes of loud snoring and possible ERICA, for which he already underwent a polysomnography, results are pending.\par he had his sleep study done 2 weeks prior at [facility], results pending.\par No side effects from medication, no seizures.\par \par *** from prior hospitalization ***\par HPI: 39 year old M with a PMH of a two seizures in August presents with a witnessed seizure that occurred at 2:15 am on 9/11. Patient's significant other witnessed the episode and recorded a video as per the patient's neurologist's instructions and called 911. The episode was preceded by a grunting sound and was described as shaking of the upper and lower extremities with right gaze deviation and right head tilt that lasted for about 1 minute. The witness noticed that the patient had blood in his mouth and had urinary incontinence. She said that the patient did not respond to verbal stimuli for 25 minutes\par after the shaking stopped. When EMS arrived the patient was unable to say the date and was conversational, but unable to hold an appropriate conversation. The patient does not remember the event. The patient stated that on 9/10 he was feeling more tired than usual. The patient's first seizure was also witnessed by his significant other and it occurred on August 16 at 3:30 am. It lasted for one minute and had a similar presentation. It was then followed by a second seizure five minutes later. He was hitting his head on the nightstand and eventually fell of the bed. He has had right shoulder pain since then. The\par patient had chest tightness that preceded the seizure in August as well as the seizure that occurred on 9/11. He states that his right shoulder has some pain.\par  He denies alcohol or illicit drug use. He has dysphagia, odynophagia, and numbness/tingling when laying on left side. He denies head trauma during the most recent episode, headache, recent illness, or vision changes. He denies staring spells, or change in taste or smell. His neurologist is Dr. Spencer from [facility] and he was supposed to have a routine EEG today and was planning on getting an MRI and sleep study. Patient's neurologist believes that the patient may have sleep apnea, which could be a trigger for the seizures. Patient's mother had multiple seizure episodes from when she was an infant until she was\par 12 years old

## 2019-11-14 NOTE — DISCUSSION/SUMMARY
[FreeTextEntry1] : Mr. 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 continue keppra to 750 XR mg BID\par 2. No driving restriction for 1 year seizure free but this assessment will be modified in 6 months if his events will be exclusively nocturnal.\par 3. He can return to work but not in hazardous conditions for the same reasons as above.\par 4. f/u in 3 months\par \par

## 2019-11-14 NOTE — HISTORY OF PRESENT ILLNESS
[FreeTextEntry1] : *** 11/13/2019 ***\par Mr. MARIEE is here for follow up .\par No seizures since last visit.\par Has mild ERICA.\par On reviewing the history there is a positive family history( mother with epilepsy)\par \par \par *** 09/20/2019 ***\par \par Mr. MARIEE is a 39 years old here for initial evaluation post hospitalization for newly diagnosed epilepsy.\par \par He had 2 previous nocturnal seizures 3 weeks apart( last one recorded by his wife) and was initiated on Keppra 500 BID well tolerated. \par his EEG was negative and his brain MRI as well. No events were recorded during his admission.\par He is overweight and has episodes of loud snoring and possible ERICA, for which he already underwent a polysomnography, results are pending.\par he had his sleep study done 2 weeks prior at [facility], results pending.\par No side effects from medication, no seizures.\par \par *** from prior hospitalization ***\par HPI: 39 year old M with a PMH of a two seizures in August presents with a witnessed seizure that occurred at 2:15 am on 9/11. Patient's significant other witnessed the episode and recorded a video as per the patient's neurologist's instructions and called 911. The episode was preceded by a grunting sound and was described as shaking of the upper and lower extremities with right gaze deviation and right head tilt that lasted for about 1 minute. The witness noticed that the patient had blood in his mouth and had urinary incontinence. She said that the patient did not respond to verbal stimuli for 25 minutes\par after the shaking stopped. When EMS arrived the patient was unable to say the date and was conversational, but unable to hold an appropriate conversation. The patient does not remember the event. The patient stated that on 9/10 he was feeling more tired than usual. The patient's first seizure was also witnessed by his significant other and it occurred on August 16 at 3:30 am. It lasted for one minute and had a similar presentation. It was then followed by a second seizure five minutes later. He was hitting his head on the nightstand and eventually fell of the bed. He has had right shoulder pain since then. The\par patient had chest tightness that preceded the seizure in August as well as the seizure that occurred on 9/11. He states that his right shoulder has some pain.\par  He denies alcohol or illicit drug use. He has dysphagia, odynophagia, and numbness/tingling when laying on left side. He denies head trauma during the most recent episode, headache, recent illness, or vision changes. He denies staring spells, or change in taste or smell. His neurologist is Dr. Spencer from [facility] and he was supposed to have a routine EEG today and was planning on getting an MRI and sleep study. Patient's neurologist believes that the patient may have sleep apnea, which could be a trigger for the seizures. Patient's mother had multiple seizure episodes from when she was an infant until she was\par 12 years old

## 2019-11-20 ENCOUNTER — APPOINTMENT (OUTPATIENT)
Dept: PULMONOLOGY | Facility: CLINIC | Age: 39
End: 2019-11-20

## 2019-11-29 ENCOUNTER — RX RENEWAL (OUTPATIENT)
Age: 39
End: 2019-11-29

## 2020-01-07 ENCOUNTER — RX RENEWAL (OUTPATIENT)
Age: 40
End: 2020-01-07

## 2020-01-08 ENCOUNTER — RX RENEWAL (OUTPATIENT)
Age: 40
End: 2020-01-08

## 2020-01-13 ENCOUNTER — APPOINTMENT (OUTPATIENT)
Dept: NEUROLOGY | Facility: CLINIC | Age: 40
End: 2020-01-13
Payer: COMMERCIAL

## 2020-01-13 VITALS
BODY MASS INDEX: 4.62 KG/M2 | SYSTOLIC BLOOD PRESSURE: 141 MMHG | HEART RATE: 89 BPM | HEIGHT: 71 IN | WEIGHT: 33 LBS | DIASTOLIC BLOOD PRESSURE: 84 MMHG

## 2020-01-13 DIAGNOSIS — G47.33 OBSTRUCTIVE SLEEP APNEA (ADULT) (PEDIATRIC): ICD-10-CM

## 2020-01-13 PROCEDURE — 99214 OFFICE O/P EST MOD 30 MIN: CPT

## 2020-01-13 NOTE — REASON FOR VISIT
[Follow-Up: _____] : a [unfilled] follow-up visit [Initial Eval - Existing Diagnosis] : an initial evaluation of an existing diagnosis [Spouse] : spouse

## 2020-01-21 PROBLEM — G47.33 OBSTRUCTIVE SLEEP APNEA: Status: ACTIVE | Noted: 2019-10-10

## 2020-01-21 NOTE — HISTORY OF PRESENT ILLNESS
[FreeTextEntry1] : *** 01/13/2020 ***\par \par KENNY MARIEE is here for follow up. He reports no seizures since August 2019 and no medication side effect.\par He has a documented ERICA but had difficulties using the CPAP.\par He returned to work and adjusted well.\par \par *** 11/13/2019 ***\par Mr. MARIEE is here for follow up .\par No seizures since last visit.\par Has mild ERICA.\par On reviewing the history there is a positive family history( mother with epilepsy)\par \par \par *** 09/20/2019 ***\par \par Mr. MARIEE is a 39 years old here for initial evaluation post hospitalization for newly diagnosed epilepsy.\par \par He had 2 previous nocturnal seizures 3 weeks apart( last one recorded by his wife) and was initiated on Keppra 500 BID well tolerated. \par his EEG was negative and his brain MRI as well. No events were recorded during his admission.\par He is overweight and has episodes of loud snoring and possible ERICA, for which he already underwent a polysomnography, results are pending.\par he had his sleep study done 2 weeks prior at [facility], results pending.\par No side effects from medication, no seizures.\par \par *** from prior hospitalization ***\par HPI: 39 year old M with a PMH of a two seizures in August presents with a witnessed seizure that occurred at 2:15 am on 9/11. Patient's significant other witnessed the episode and recorded a video as per the patient's neurologist's instructions and called 911. The episode was preceded by a grunting sound and was described as shaking of the upper and lower extremities with right gaze deviation and right head tilt that lasted for about 1 minute. The witness noticed that the patient had blood in his mouth and had urinary incontinence. She said that the patient did not respond to verbal stimuli for 25 minutes\par after the shaking stopped. When EMS arrived the patient was unable to say the date and was conversational, but unable to hold an appropriate conversation. The patient does not remember the event. The patient stated that on 9/10 he was feeling more tired than usual. The patient's first seizure was also witnessed by his significant other and it occurred on August 16 at 3:30 am. It lasted for one minute and had a similar presentation. It was then followed by a second seizure five minutes later. He was hitting his head on the nightstand and eventually fell of the bed. He has had right shoulder pain since then. The\par patient had chest tightness that preceded the seizure in August as well as the seizure that occurred on 9/11. He states that his right shoulder has some pain.\par  He denies alcohol or illicit drug use. He has dysphagia, odynophagia, and numbness/tingling when laying on left side. He denies head trauma during the most recent episode, headache, recent illness, or vision changes. He denies staring spells, or change in taste or smell. His neurologist is Dr. Spencer from [facility] and he was supposed to have a routine EEG today and was planning on getting an MRI and sleep study. Patient's neurologist believes that the patient may have sleep apnea, which could be a trigger for the seizures. Patient's mother had multiple seizure episodes from when she was an infant until she was\par 12 years old

## 2020-01-21 NOTE — DISCUSSION/SUMMARY
[FreeTextEntry1] : Mr. 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 continue keppra to 750 XR mg BID\par 2. No driving restriction for 1 year seizure free but this assessment will be modified in 6 months if his events will be exclusively nocturnal ( as of now he is seizure free for 5 months)\par 3. He can return to work but not in hazardous conditions for the same reasons as above.\par 4. f/u in 3 months\par 5. No blood work to be done today due to stable clinical presentation.\par \par

## 2020-10-11 ENCOUNTER — RX RENEWAL (OUTPATIENT)
Age: 40
End: 2020-10-11

## 2021-01-11 ENCOUNTER — APPOINTMENT (OUTPATIENT)
Dept: NEUROLOGY | Facility: CLINIC | Age: 41
End: 2021-01-11
Payer: COMMERCIAL

## 2021-01-11 DIAGNOSIS — R56.9 UNSPECIFIED CONVULSIONS: ICD-10-CM

## 2021-01-11 PROCEDURE — 99214 OFFICE O/P EST MOD 30 MIN: CPT | Mod: 95

## 2021-01-25 PROBLEM — R56.9 FOCAL SEIZURE: Status: ACTIVE | Noted: 2019-09-13

## 2021-01-25 NOTE — HISTORY OF PRESENT ILLNESS
[FreeTextEntry1] : *** 01/11/2021 *** \par \par Appointment was conducted by video conference in place of cancelled appointment due to heighten concern over coronavirus infection.\par Verbal consent was given on *** 01/11/2021 ***  by the patient, who understand that tele-visits will be charged to insurance and may involve co-pay for patient\par Physician location home\par Patient location home\par Patient on call: Dr. Clayton , patient\par \par \par KENNY MARIEE IS SEE FOR FOLLOW UP.\par he had a seizure while dc'ing the medication. none since restarting it.\par \par *** 01/13/2020 ***\par \par KENNY MARIEE is here for follow up. He reports no seizures since August 2019 and no medication side effect.\par He has a documented ERICA but had difficulties using the CPAP.\par He returned to work and adjusted well.\par \par *** 11/13/2019 ***\par Mr. MARIEE is here for follow up .\par No seizures since last visit.\par Has mild ERICA.\par On reviewing the history there is a positive family history( mother with epilepsy)\par \par \par *** 09/20/2019 ***\par \par Mr. MARIEE is a 39 years old here for initial evaluation post hospitalization for newly diagnosed epilepsy.\par \par He had 2 previous nocturnal seizures 3 weeks apart( last one recorded by his wife) and was initiated on Keppra 500 BID well tolerated. \par his EEG was negative and his brain MRI as well. No events were recorded during his admission.\par He is overweight and has episodes of loud snoring and possible ERICA, for which he already underwent a polysomnography, results are pending.\par he had his sleep study done 2 weeks prior at [facility], results pending.\par No side effects from medication, no seizures.\par \par *** from prior hospitalization ***\par HPI: 39 year old M with a PMH of a two seizures in August presents with a witnessed seizure that occurred at 2:15 am on 9/11. Patient's significant other witnessed the episode and recorded a video as per the patient's neurologist's instructions and called 911. The episode was preceded by a grunting sound and was described as shaking of the upper and lower extremities with right gaze deviation and right head tilt that lasted for about 1 minute. The witness noticed that the patient had blood in his mouth and had urinary incontinence. She said that the patient did not respond to verbal stimuli for 25 minutes\par after the shaking stopped. When EMS arrived the patient was unable to say the date and was conversational, but unable to hold an appropriate conversation. The patient does not remember the event. The patient stated that on 9/10 he was feeling more tired than usual. The patient's first seizure was also witnessed by his significant other and it occurred on August 16 at 3:30 am. It lasted for one minute and had a similar presentation. It was then followed by a second seizure five minutes later. He was hitting his head on the nightstand and eventually fell of the bed. He has had right shoulder pain since then. The\par patient had chest tightness that preceded the seizure in August as well as the seizure that occurred on 9/11. He states that his right shoulder has some pain.\par  He denies alcohol or illicit drug use. He has dysphagia, odynophagia, and numbness/tingling when laying on left side. He denies head trauma during the most recent episode, headache, recent illness, or vision changes. He denies staring spells, or change in taste or smell. His neurologist is Dr. Spencer from [facility] and he was supposed to have a routine EEG today and was planning on getting an MRI and sleep study. Patient's neurologist believes that the patient may have sleep apnea, which could be a trigger for the seizures. Patient's mother had multiple seizure episodes from when she was an infant until she was\par 12 years old

## 2021-12-28 ENCOUNTER — RX RENEWAL (OUTPATIENT)
Age: 41
End: 2021-12-28

## 2022-05-19 NOTE — PHYSICAL EXAM
[FreeTextEntry1] : MS: alert & oriented to person, place time, good fund of knowledge and recall for recent and remote events. Responses appropriate \par \par CN: Optic disks sharp bilaterally, venous pulsations not well visuallized, VFF to confrontation, EOM full without nystagmus, smooth pursuit without saccadic intrusions, PERRL, V1-V3 intact to light touch, face symmetrical, hears finger rub bilaterally, palate elevates symmetrically, shoulders elevate symmetrically, tongue midline\par \par MOTOR: normal tone x 4 extremities, Power 5/5 proximally and distally bilaterally, no drift, normal rapid alternating movements. \par \par SENSORY: intact LT x 4 extremities\par \par REFLEXES: biceps 2+ bilaterally, triceps 2+ bilaterally, brachioradialis 2+ bilaterally, patella 2+ bilaterally, ankle 2+ bilaterally, plantar flexor bilaterally\par \par COORD: normal FNF, no tremor or dysmetria\par \par GAIT: normal base, Romberg negative, normal gait, able to walk on toes, heels and tandem normal.\par \par Cervical Spine: \par    ROM: FULL  \par    Tenderness: NONE \par  cgi<=2  No SI  possible CAAL

## 2022-09-19 ENCOUNTER — RX RENEWAL (OUTPATIENT)
Age: 42
End: 2022-09-19

## 2023-06-30 ENCOUNTER — APPOINTMENT (OUTPATIENT)
Dept: INTERNAL MEDICINE | Facility: CLINIC | Age: 43
End: 2023-06-30
Payer: COMMERCIAL

## 2023-06-30 ENCOUNTER — LABORATORY RESULT (OUTPATIENT)
Age: 43
End: 2023-06-30

## 2023-06-30 ENCOUNTER — NON-APPOINTMENT (OUTPATIENT)
Age: 43
End: 2023-06-30

## 2023-06-30 ENCOUNTER — OUTPATIENT (OUTPATIENT)
Dept: OUTPATIENT SERVICES | Facility: HOSPITAL | Age: 43
LOS: 1 days | End: 2023-06-30
Payer: COMMERCIAL

## 2023-06-30 VITALS
HEART RATE: 85 BPM | BODY MASS INDEX: 44.1 KG/M2 | SYSTOLIC BLOOD PRESSURE: 134 MMHG | HEIGHT: 71 IN | OXYGEN SATURATION: 98 % | DIASTOLIC BLOOD PRESSURE: 84 MMHG | WEIGHT: 315 LBS

## 2023-06-30 DIAGNOSIS — Z23 ENCOUNTER FOR IMMUNIZATION: ICD-10-CM

## 2023-06-30 DIAGNOSIS — Z80.1 FAMILY HISTORY OF MALIGNANT NEOPLASM OF TRACHEA, BRONCHUS AND LUNG: ICD-10-CM

## 2023-06-30 DIAGNOSIS — Z00.00 ENCOUNTER FOR GENERAL ADULT MEDICAL EXAMINATION W/OUT ABNORMAL FINDINGS: ICD-10-CM

## 2023-06-30 DIAGNOSIS — Z80.0 FAMILY HISTORY OF MALIGNANT NEOPLASM OF DIGESTIVE ORGANS: ICD-10-CM

## 2023-06-30 DIAGNOSIS — Z78.9 OTHER SPECIFIED HEALTH STATUS: ICD-10-CM

## 2023-06-30 DIAGNOSIS — Z80.42 FAMILY HISTORY OF MALIGNANT NEOPLASM OF PROSTATE: ICD-10-CM

## 2023-06-30 DIAGNOSIS — J30.9 ALLERGIC RHINITIS, UNSPECIFIED: ICD-10-CM

## 2023-06-30 LAB
ALBUMIN SERPL ELPH-MCNC: 4.6 G/DL
ALP BLD-CCNC: 55 U/L
ALT SERPL-CCNC: 21 U/L
ANION GAP SERPL CALC-SCNC: 17 MMOL/L
AST SERPL-CCNC: 20 U/L
BILIRUB SERPL-MCNC: 0.2 MG/DL
BUN SERPL-MCNC: 17 MG/DL
CALCIUM SERPL-MCNC: 10 MG/DL
CHLORIDE SERPL-SCNC: 103 MMOL/L
CHOLEST SERPL-MCNC: 222 MG/DL
CO2 SERPL-SCNC: 20 MMOL/L
CREAT SERPL-MCNC: 1.13 MG/DL
EGFR: 84 ML/MIN/1.73M2
GLUCOSE SERPL-MCNC: 74 MG/DL
HDLC SERPL-MCNC: 39 MG/DL
LDLC SERPL CALC-MCNC: 154 MG/DL
NONHDLC SERPL-MCNC: 183 MG/DL
POTASSIUM SERPL-SCNC: 4.8 MMOL/L
PROT SERPL-MCNC: 7.5 G/DL
PSA SERPL-MCNC: 0.52 NG/ML
SODIUM SERPL-SCNC: 141 MMOL/L
TRIGL SERPL-MCNC: 147 MG/DL
TSH SERPL-ACNC: 1.95 UIU/ML

## 2023-06-30 PROCEDURE — 90471 IMMUNIZATION ADMIN: CPT

## 2023-06-30 PROCEDURE — 99396 PREV VISIT EST AGE 40-64: CPT | Mod: GE

## 2023-06-30 PROCEDURE — 90715 TDAP VACCINE 7 YRS/> IM: CPT

## 2023-06-30 PROCEDURE — G0463: CPT

## 2023-06-30 RX ORDER — LISINOPRIL 10 MG/1
10 TABLET ORAL
Qty: 30 | Refills: 0 | Status: DISCONTINUED | COMMUNITY
Start: 2019-10-01 | End: 2023-06-30

## 2023-07-01 NOTE — PHYSICAL EXAM
[No Acute Distress] : no acute distress [Well Nourished] : well nourished [Well Developed] : well developed [Well-Appearing] : well-appearing [PERRL] : pupils equal round and reactive to light [EOMI] : extraocular movements intact [No JVD] : no jugular venous distention [No Lymphadenopathy] : no lymphadenopathy [Supple] : supple [Normal] : normal rate, regular rhythm, normal S1 and S2 and no murmur heard [No Carotid Bruits] : no carotid bruits [No Edema] : there was no peripheral edema [Soft] : abdomen soft [Non Tender] : non-tender [Normal Bowel Sounds] : normal bowel sounds [de-identified] : Obese [de-identified] : b/l conjunctival injection [de-identified] : acanthosis nigracans [de-identified] : Large

## 2023-07-01 NOTE — HISTORY OF PRESENT ILLNESS
[FreeTextEntry1] : 43 YOM with nocturnal epilepsy, ERICA on cpap here for CPE [de-identified] : 43YOM with nocturnal epilepsy, HTN not on meds, obesity, ERICA on cpap here for CPE. He is accompanied by his wife.\par \par Last week patient was unable to talk due to excessive congestion, which has improved. Went to urgent care, strep negative. Is having dry cough last night, and b/l conjunctival injection. Denies fever. No diarrhea, nausea, or vomiting. \par \par #Nocturnal epilepsy\par - Has been seeing outside neuro, last in January. Last seizure was January 2021 ISO medication non-compliance. \par - Will be seeing Dr. Clayton on September 8th\par \par #ERICA\par - Is using a CPAP machine, starting using in April after his father was in the hospital. Machine helps with his energy. CPAP was given by Dr. Clayton, does not have a sleep doctor. Current setting is 5\par \par #Meds\par - Keppra 750 twice a day\par \par #Family History\par - Father with prostate cancer\par - Brother with stomach cancer, found out at stage 4. Diagnosed at age 50\par - Mother with lung cancer\par \par #Social\par - Works for [employer]\par - Diet: In last 24 hours had pizza, taco. No vegetables. Drinks juice. Coffee with milk table spoon of sugar. Drinks 1-2x weekly. Smokes currently. Pack a day for 20 years. Wants to try to quit with lozenge. Has lozenges at home. Wants to try to quit. \par - Exercise: Does not exercise\par - Has been  for 2 years, together with wife for ~5 years\par - Right knee surgery 2x.

## 2023-07-03 ENCOUNTER — NON-APPOINTMENT (OUTPATIENT)
Age: 43
End: 2023-07-03

## 2023-07-03 DIAGNOSIS — I10 ESSENTIAL (PRIMARY) HYPERTENSION: ICD-10-CM

## 2023-07-03 LAB
ESTIMATED AVERAGE GLUCOSE: 120 MG/DL
HBA1C MFR BLD HPLC: 5.8 %

## 2023-07-05 DIAGNOSIS — J30.9 ALLERGIC RHINITIS, UNSPECIFIED: ICD-10-CM

## 2023-07-05 DIAGNOSIS — Z78.9 OTHER SPECIFIED HEALTH STATUS: ICD-10-CM

## 2023-07-05 DIAGNOSIS — Z00.00 ENCOUNTER FOR GENERAL ADULT MEDICAL EXAMINATION WITHOUT ABNORMAL FINDINGS: ICD-10-CM

## 2023-07-05 DIAGNOSIS — Z12.11 ENCOUNTER FOR SCREENING FOR MALIGNANT NEOPLASM OF COLON: ICD-10-CM

## 2023-07-05 DIAGNOSIS — Z23 ENCOUNTER FOR IMMUNIZATION: ICD-10-CM

## 2023-07-05 DIAGNOSIS — F17.210 NICOTINE DEPENDENCE, CIGARETTES, UNCOMPLICATED: ICD-10-CM

## 2023-07-05 DIAGNOSIS — Z80.0 FAMILY HISTORY OF MALIGNANT NEOPLASM OF DIGESTIVE ORGANS: ICD-10-CM

## 2023-08-04 ENCOUNTER — APPOINTMENT (OUTPATIENT)
Dept: INTERNAL MEDICINE | Facility: CLINIC | Age: 43
End: 2023-08-04
Payer: COMMERCIAL

## 2023-08-04 ENCOUNTER — OUTPATIENT (OUTPATIENT)
Dept: OUTPATIENT SERVICES | Facility: HOSPITAL | Age: 43
LOS: 1 days | End: 2023-08-04
Payer: COMMERCIAL

## 2023-08-04 VITALS
SYSTOLIC BLOOD PRESSURE: 128 MMHG | DIASTOLIC BLOOD PRESSURE: 78 MMHG | HEART RATE: 76 BPM | HEIGHT: 71 IN | OXYGEN SATURATION: 98 %

## 2023-08-04 DIAGNOSIS — E63.9 NUTRITIONAL DEFICIENCY, UNSPECIFIED: ICD-10-CM

## 2023-08-04 DIAGNOSIS — F17.210 NICOTINE DEPENDENCE, CIGARETTES, UNCOMPLICATED: ICD-10-CM

## 2023-08-04 DIAGNOSIS — R03.0 ELEVATED BLOOD-PRESSURE READING, W/OUT DIAGNOSIS OF HYPERTENSION: ICD-10-CM

## 2023-08-04 DIAGNOSIS — I10 ESSENTIAL (PRIMARY) HYPERTENSION: ICD-10-CM

## 2023-08-04 PROCEDURE — G0463: CPT

## 2023-08-04 PROCEDURE — 99213 OFFICE O/P EST LOW 20 MIN: CPT

## 2023-08-05 NOTE — PHYSICAL EXAM
[No Acute Distress] : no acute distress [Well-Appearing] : well-appearing [Normal] : normal rate, regular rhythm, normal S1 and S2 and no murmur heard [No Carotid Bruits] : no carotid bruits [No Edema] : there was no peripheral edema [de-identified] : obese

## 2023-08-05 NOTE — ASSESSMENT
[FreeTextEntry1] : 43 YOM with ERICA on CPAP here for BP check and f/u with smoking. He is still smoking, BP borderline today without intervention.

## 2023-08-05 NOTE — HISTORY OF PRESENT ILLNESS
[FreeTextEntry1] : Follow up for HTN and smoking [de-identified] : 43 YOM with nocturnal epilepsy on keppra, ERICA on CPAP, obesity here for BP check and smoking cessation follow up.  He has had a significant social stressor in the last month - his wife's father has had an unfortunate illness and will be palliatively extubated today.  #HTN - BP in office today is 128/78  #Smoking - Still smoking, about the same amount - Lozenges were working. Patch makes nausea. Chantix mood.  - Declines smoking cessation program  #Diet: Has not been exercising. 24 hour food log: Hero sandwich, coffee with 2x sugar and milk.

## 2023-08-05 NOTE — PLAN
[FreeTextEntry1] : #HTN - Extensive discussion had with patient regarding his HTN and elevated cholesterol. He is at borderline ASCVD risk and has had intermittently elevated blood pressure in the past. Low dose amlodipine + statin was offered, but patient prefers to try to lose weight himself and modify himself.  #Smoking - Still smoking. Has pulm appointment in may - Declines our help in smoking cessation. Does not want program. Prefers to continue to try to lozenge  RTC in 4 months for BP check and to assess smoking. If still smoking and BP still borderline, would lean towards starting anti-hypertensive + statin given long term risk.

## 2023-08-07 DIAGNOSIS — F17.210 NICOTINE DEPENDENCE, CIGARETTES, UNCOMPLICATED: ICD-10-CM

## 2023-08-07 DIAGNOSIS — E63.9 NUTRITIONAL DEFICIENCY, UNSPECIFIED: ICD-10-CM

## 2023-08-07 DIAGNOSIS — R03.0 ELEVATED BLOOD-PRESSURE READING, WITHOUT DIAGNOSIS OF HYPERTENSION: ICD-10-CM

## 2023-08-11 ENCOUNTER — APPOINTMENT (OUTPATIENT)
Dept: GASTROENTEROLOGY | Facility: CLINIC | Age: 43
End: 2023-08-11
Payer: COMMERCIAL

## 2023-08-11 VITALS
DIASTOLIC BLOOD PRESSURE: 88 MMHG | WEIGHT: 315 LBS | HEIGHT: 71 IN | TEMPERATURE: 98.7 F | SYSTOLIC BLOOD PRESSURE: 136 MMHG | HEART RATE: 84 BPM | OXYGEN SATURATION: 98 % | BODY MASS INDEX: 44.1 KG/M2

## 2023-08-11 DIAGNOSIS — Z12.11 ENCOUNTER FOR SCREENING FOR MALIGNANT NEOPLASM OF COLON: ICD-10-CM

## 2023-08-11 PROCEDURE — 99203 OFFICE O/P NEW LOW 30 MIN: CPT

## 2023-08-11 RX ORDER — CETIRIZINE HYDROCHLORIDE 10 MG/1
10 TABLET, COATED ORAL
Qty: 30 | Refills: 4 | Status: DISCONTINUED | COMMUNITY
Start: 2023-06-30 | End: 2023-08-11

## 2023-08-11 RX ORDER — SODIUM SULFATE, POTASSIUM SULFATE AND MAGNESIUM SULFATE 1.6; 3.13; 17.5 G/177ML; G/177ML; G/177ML
17.5-3.13-1.6 SOLUTION ORAL
Qty: 2 | Refills: 0 | Status: ACTIVE | COMMUNITY
Start: 2023-08-11 | End: 1900-01-01

## 2023-08-11 NOTE — PHYSICAL EXAM
[Alert] : alert [Normal Voice/Communication] : normal voice/communication [Healthy Appearing] : healthy appearing [No Acute Distress] : no acute distress [Sclera] : the sclera and conjunctiva were normal [Hearing Threshold Finger Rub Not Divide] : hearing was normal [Normal Lips/Gums] : the lips and gums were normal [Oropharynx] : the oropharynx was normal [Normal Appearance] : the appearance of the neck was normal [No Neck Mass] : no neck mass was observed [No Respiratory Distress] : no respiratory distress [No Acc Muscle Use] : no accessory muscle use [Respiration, Rhythm And Depth] : normal respiratory rhythm and effort [Auscultation Breath Sounds / Voice Sounds] : lungs were clear to auscultation bilaterally [Heart Rate And Rhythm] : heart rate was normal and rhythm regular [Normal S1, S2] : normal S1 and S2 [Murmurs] : no murmurs [Bowel Sounds] : normal bowel sounds [Abdomen Tenderness] : non-tender [No Masses] : no abdominal mass palpated [Abdomen Soft] : soft [Oriented To Time, Place, And Person] : oriented to person, place, and time [Cervical Lymph Nodes Enlarged Posterior Bilaterally] : no posterior cervical lymphadenopathy [Supraclavicular Lymph Nodes Enlarged Bilaterally] : no supraclavicular lymphadenopathy [Cervical Lymph Nodes Enlarged Anterior Bilaterally] : no anterior cervical lymphadenopathy [No CVA Tenderness] : no CVA  tenderness [No Spinal Tenderness] : no spinal tenderness [Abnormal Walk] : normal gait [No Clubbing, Cyanosis] : no clubbing or cyanosis of the fingernails [Normal Color / Pigmentation] : normal skin color and pigmentation [] : no rash [No Focal Deficits] : no focal deficits

## 2023-08-11 NOTE — HISTORY OF PRESENT ILLNESS
[FreeTextEntry1] : KENNY MARIEE 43 year B M  presents for initial evaluation and consultation for Colorectal cancer screening. Denies any constipation,diarrhea, BPR,melena or unintentional weight loss.Reports having good appetite.He has  history of colorectal or any GI cancer in the close family members. His brother  of stomach cancer at age 50 No history of any cardiac or pulmonary disease.Never had any colonoscopy in the past. He has been smoking 1 pack a day for the past 30 years.  He denied regular alcohol abuse. He has a history of ERICA on CPAP but not using CPAP for a number of years

## 2023-08-11 NOTE — ASSESSMENT
[FreeTextEntry1] : 43-year-old black obese male with a family history of GI cancer was sent for CRC screening and colonoscopy.  RBA including bowel prep explained

## 2023-08-11 NOTE — PLAN
[TextEntry] : Schedule Colonoscopy Follow the instructions for Bowel prep and liquid Diet for 24 hours Risks, benefits and alternatives including non invasive tests explained suprep

## 2023-09-08 ENCOUNTER — APPOINTMENT (OUTPATIENT)
Dept: NEUROLOGY | Facility: CLINIC | Age: 43
End: 2023-09-08
Payer: COMMERCIAL

## 2023-09-08 VITALS
HEART RATE: 74 BPM | DIASTOLIC BLOOD PRESSURE: 85 MMHG | WEIGHT: 315 LBS | HEIGHT: 71 IN | BODY MASS INDEX: 44.1 KG/M2 | SYSTOLIC BLOOD PRESSURE: 123 MMHG

## 2023-09-08 DIAGNOSIS — G40.802 OTHER EPILEPSY, NOT INTRACTABLE, W/OUT STATUS EPILEPTICUS: ICD-10-CM

## 2023-09-08 PROCEDURE — 99214 OFFICE O/P EST MOD 30 MIN: CPT

## 2023-09-08 RX ORDER — CENOBAMATE 12.5-25MG
14 X 12.5 MG & KIT ORAL
Qty: 28 | Refills: 0 | Status: ACTIVE | COMMUNITY
Start: 2023-09-08 | End: 1900-01-01

## 2023-09-11 PROBLEM — G40.802 FRONTAL LOBE EPILEPSY: Status: ACTIVE | Noted: 2023-09-11

## 2023-10-18 ENCOUNTER — RESULT REVIEW (OUTPATIENT)
Age: 43
End: 2023-10-18

## 2023-10-18 ENCOUNTER — TRANSCRIPTION ENCOUNTER (OUTPATIENT)
Age: 43
End: 2023-10-18

## 2023-10-18 ENCOUNTER — APPOINTMENT (OUTPATIENT)
Dept: GASTROENTEROLOGY | Facility: HOSPITAL | Age: 43
End: 2023-10-18

## 2023-10-18 ENCOUNTER — OUTPATIENT (OUTPATIENT)
Dept: OUTPATIENT SERVICES | Facility: HOSPITAL | Age: 43
LOS: 1 days | End: 2023-10-18
Payer: COMMERCIAL

## 2023-10-18 VITALS
DIASTOLIC BLOOD PRESSURE: 71 MMHG | HEIGHT: 71 IN | HEART RATE: 70 BPM | RESPIRATION RATE: 15 BRPM | TEMPERATURE: 98 F | SYSTOLIC BLOOD PRESSURE: 121 MMHG | OXYGEN SATURATION: 100 % | WEIGHT: 315 LBS

## 2023-10-18 VITALS
RESPIRATION RATE: 20 BRPM | HEART RATE: 60 BPM | DIASTOLIC BLOOD PRESSURE: 71 MMHG | SYSTOLIC BLOOD PRESSURE: 130 MMHG | OXYGEN SATURATION: 96 %

## 2023-10-18 DIAGNOSIS — R19.7 DIARRHEA, UNSPECIFIED: ICD-10-CM

## 2023-10-18 DIAGNOSIS — Z98.890 OTHER SPECIFIED POSTPROCEDURAL STATES: Chronic | ICD-10-CM

## 2023-10-18 PROCEDURE — 88305 TISSUE EXAM BY PATHOLOGIST: CPT

## 2023-10-18 PROCEDURE — 88305 TISSUE EXAM BY PATHOLOGIST: CPT | Mod: 26

## 2023-10-18 PROCEDURE — 45385 COLONOSCOPY W/LESION REMOVAL: CPT

## 2023-10-18 PROCEDURE — 45380 COLONOSCOPY AND BIOPSY: CPT | Mod: PT

## 2023-10-18 RX ORDER — SODIUM CHLORIDE 9 MG/ML
500 INJECTION INTRAMUSCULAR; INTRAVENOUS; SUBCUTANEOUS
Refills: 0 | Status: COMPLETED | OUTPATIENT
Start: 2023-10-18 | End: 2023-10-18

## 2023-10-18 RX ORDER — LEVETIRACETAM 250 MG/1
1 TABLET, FILM COATED ORAL
Refills: 0 | DISCHARGE

## 2023-10-18 RX ADMIN — SODIUM CHLORIDE 30 MILLILITER(S): 9 INJECTION INTRAMUSCULAR; INTRAVENOUS; SUBCUTANEOUS at 10:05

## 2023-10-18 NOTE — PRE PROCEDURE NOTE - PRE PROCEDURE EVALUATION
Attending Physician:  Dr. Puentes                Procedure: colonoscopy    Indication for Procedure: CRC screening  ________________________________________________________  PAST MEDICAL & SURGICAL HISTORY:  ERICA on CPAP      Nocturnal seizure      H/O knee surgery        ALLERGIES:  No Known Drug Allergies  pt allergic to grapefruit. (Anaphylaxis)    HOME MEDICATIONS:  levETIRAcetam 750 mg oral tablet: 1 tab(s) orally 2 times a day    AICD/PPM: [ ] yes   [x ] no    PERTINENT LAB DATA:                      PHYSICAL EXAMINATION:    Height (cm): 180.3  Weight (kg): 145.1  BMI (kg/m2): 44.6  BSA (m2): 2.57T(C): 36.4  HR: 70  BP: 121/71  RR: 15  SpO2: 100%    Constitutional: NAD  HEENT: PERRLA, EOMI,    Neck:  No JVD  Respiratory: CTAB/L  Cardiovascular: S1 and S2  Gastrointestinal: BS+, soft, NT/ND  Extremities: No peripheral edema  Neurological: A/O x 3, no focal deficits  Psychiatric: Normal mood, normal affect  Skin: No rashes    ASA Class: I [ ]  II [x ]  III [ ]  IV [ ]    COMMENTS:    The patient is a suitable candidate for the planned procedure unless box checked [ ]  No, explain:

## 2023-10-18 NOTE — PRE-ANESTHESIA EVALUATION ADULT - NSANTHPMHFT_GEN_ALL_CORE
43M with PMH of morbid obesity BMI 45, ERICA on CPAP (sometimes), nocturnal seizure (unknown etiology, diagnosed in 2018, last seizure was 2020) here for above procedure. Denies CP, SOB, GERD like symptoms. No issues with anesthesia in the past.

## 2023-10-18 NOTE — ASU DISCHARGE PLAN (ADULT/PEDIATRIC) - NS MD DC FALL RISK RISK
For information on Fall & Injury Prevention, visit: [email or web address] OR  [email or web address] OR  https://www.cdc.gov/steadi/patient.html

## 2023-10-18 NOTE — ASU PREOP CHECKLIST - NS PREOP CHK MONITOR ANESTHESIA CONSENT
Uncontrolled pain w  diarrhea, bloating-- improved.   Continue smaller volume slow bolus feeds and medications  through G-tube.   Hold off on continuous tube feeding through pump.  Instruct bolus tube feeds to patient.  Scheduled simthicone PRN IV antiemetics.  Pain control with sched fentanyl,  PRN oxycodone- monitor response.   Monitoring for adverse effects related to the use of narcotics  Palliative has evaluated patient.    done

## 2023-10-19 LAB
SURGICAL PATHOLOGY STUDY: SIGNIFICANT CHANGE UP
SURGICAL PATHOLOGY STUDY: SIGNIFICANT CHANGE UP

## 2023-11-15 ENCOUNTER — APPOINTMENT (OUTPATIENT)
Dept: PULMONOLOGY | Facility: CLINIC | Age: 43
End: 2023-11-15
Payer: COMMERCIAL

## 2023-11-15 VITALS
BODY MASS INDEX: 44.1 KG/M2 | SYSTOLIC BLOOD PRESSURE: 127 MMHG | HEART RATE: 83 BPM | HEIGHT: 71 IN | WEIGHT: 315 LBS | DIASTOLIC BLOOD PRESSURE: 90 MMHG

## 2023-11-15 PROCEDURE — 99406 BEHAV CHNG SMOKING 3-10 MIN: CPT

## 2023-11-15 PROCEDURE — 94726 PLETHYSMOGRAPHY LUNG VOLUMES: CPT

## 2023-11-15 PROCEDURE — 99204 OFFICE O/P NEW MOD 45 MIN: CPT | Mod: 25

## 2023-11-15 PROCEDURE — 94729 DIFFUSING CAPACITY: CPT

## 2023-11-15 PROCEDURE — ZZZZZ: CPT

## 2023-11-15 PROCEDURE — 94060 EVALUATION OF WHEEZING: CPT

## 2023-11-16 PROBLEM — G47.33 OBSTRUCTIVE SLEEP APNEA (ADULT) (PEDIATRIC): Chronic | Status: ACTIVE | Noted: 2023-10-18

## 2023-11-16 PROBLEM — R56.9 UNSPECIFIED CONVULSIONS: Chronic | Status: ACTIVE | Noted: 2023-10-18

## 2023-12-08 ENCOUNTER — APPOINTMENT (OUTPATIENT)
Dept: NEUROLOGY | Facility: CLINIC | Age: 43
End: 2023-12-08

## 2024-02-23 ENCOUNTER — OUTPATIENT (OUTPATIENT)
Dept: OUTPATIENT SERVICES | Facility: HOSPITAL | Age: 44
LOS: 1 days | End: 2024-02-23
Payer: COMMERCIAL

## 2024-02-23 ENCOUNTER — APPOINTMENT (OUTPATIENT)
Dept: SLEEP CENTER | Facility: CLINIC | Age: 44
End: 2024-02-23
Payer: COMMERCIAL

## 2024-02-23 DIAGNOSIS — Z98.890 OTHER SPECIFIED POSTPROCEDURAL STATES: Chronic | ICD-10-CM

## 2024-02-23 PROCEDURE — 95810 POLYSOM 6/> YRS 4/> PARAM: CPT | Mod: 26

## 2024-02-23 PROCEDURE — 95810 POLYSOM 6/> YRS 4/> PARAM: CPT

## 2024-02-26 DIAGNOSIS — G47.33 OBSTRUCTIVE SLEEP APNEA (ADULT) (PEDIATRIC): ICD-10-CM

## 2024-03-01 ENCOUNTER — APPOINTMENT (OUTPATIENT)
Dept: PULMONOLOGY | Facility: CLINIC | Age: 44
End: 2024-03-01
Payer: COMMERCIAL

## 2024-03-01 PROCEDURE — 99441: CPT

## 2024-04-09 NOTE — PRE-ANESTHESIA EVALUATION ADULT - BP NONINVASIVE SYSTOLIC (MM HG)
Detail Level: Detailed Add 06430 Cpt? (Important Note: In 2017 The Use Of 56585 Is Being Tracked By Cms To Determine Future Global Period Reimbursement For Global Periods): yes 121

## 2024-04-15 ENCOUNTER — APPOINTMENT (OUTPATIENT)
Dept: SLEEP CENTER | Facility: CLINIC | Age: 44
End: 2024-04-15
Payer: COMMERCIAL

## 2024-04-15 ENCOUNTER — OUTPATIENT (OUTPATIENT)
Dept: OUTPATIENT SERVICES | Facility: HOSPITAL | Age: 44
LOS: 1 days | End: 2024-04-15
Payer: COMMERCIAL

## 2024-04-15 DIAGNOSIS — Z98.890 OTHER SPECIFIED POSTPROCEDURAL STATES: Chronic | ICD-10-CM

## 2024-04-15 PROCEDURE — G0400: CPT

## 2024-04-15 PROCEDURE — G0400: CPT | Mod: 26

## 2024-04-22 DIAGNOSIS — G47.33 OBSTRUCTIVE SLEEP APNEA (ADULT) (PEDIATRIC): ICD-10-CM

## 2024-05-03 ENCOUNTER — APPOINTMENT (OUTPATIENT)
Dept: PULMONOLOGY | Facility: CLINIC | Age: 44
End: 2024-05-03
Payer: COMMERCIAL

## 2024-05-03 PROCEDURE — 99441: CPT

## 2024-05-22 ENCOUNTER — RX RENEWAL (OUTPATIENT)
Age: 44
End: 2024-05-22

## 2024-05-22 RX ORDER — LEVETIRACETAM 750 MG/1
750 TABLET, EXTENDED RELEASE ORAL
Qty: 180 | Refills: 2 | Status: ACTIVE | COMMUNITY
Start: 2019-09-20 | End: 1900-01-01

## 2024-08-19 ENCOUNTER — APPOINTMENT (OUTPATIENT)
Dept: INTERNAL MEDICINE | Facility: CLINIC | Age: 44
End: 2024-08-19
Payer: COMMERCIAL

## 2024-08-19 ENCOUNTER — OUTPATIENT (OUTPATIENT)
Dept: OUTPATIENT SERVICES | Facility: HOSPITAL | Age: 44
LOS: 1 days | End: 2024-08-19
Payer: COMMERCIAL

## 2024-08-19 ENCOUNTER — TRANSCRIPTION ENCOUNTER (OUTPATIENT)
Age: 44
End: 2024-08-19

## 2024-08-19 VITALS
DIASTOLIC BLOOD PRESSURE: 70 MMHG | BODY MASS INDEX: 44.1 KG/M2 | HEART RATE: 80 BPM | HEIGHT: 71 IN | WEIGHT: 315 LBS | OXYGEN SATURATION: 98 % | SYSTOLIC BLOOD PRESSURE: 100 MMHG

## 2024-08-19 DIAGNOSIS — E66.01 MORBID (SEVERE) OBESITY DUE TO EXCESS CALORIES: ICD-10-CM

## 2024-08-19 DIAGNOSIS — I10 ESSENTIAL (PRIMARY) HYPERTENSION: ICD-10-CM

## 2024-08-19 DIAGNOSIS — G40.802 OTHER EPILEPSY, NOT INTRACTABLE, W/OUT STATUS EPILEPTICUS: ICD-10-CM

## 2024-08-19 DIAGNOSIS — G47.33 OBSTRUCTIVE SLEEP APNEA (ADULT) (PEDIATRIC): ICD-10-CM

## 2024-08-19 DIAGNOSIS — F17.210 NICOTINE DEPENDENCE, CIGARETTES, UNCOMPLICATED: ICD-10-CM

## 2024-08-19 DIAGNOSIS — M25.561 PAIN IN RIGHT KNEE: ICD-10-CM

## 2024-08-19 DIAGNOSIS — Z00.00 ENCOUNTER FOR GENERAL ADULT MEDICAL EXAMINATION W/OUT ABNORMAL FINDINGS: ICD-10-CM

## 2024-08-19 DIAGNOSIS — Z98.890 OTHER SPECIFIED POSTPROCEDURAL STATES: Chronic | ICD-10-CM

## 2024-08-19 PROCEDURE — 80053 COMPREHEN METABOLIC PANEL: CPT

## 2024-08-19 PROCEDURE — 99214 OFFICE O/P EST MOD 30 MIN: CPT | Mod: GC

## 2024-08-19 PROCEDURE — G0463: CPT

## 2024-08-19 PROCEDURE — 83036 HEMOGLOBIN GLYCOSYLATED A1C: CPT

## 2024-08-19 PROCEDURE — 85025 COMPLETE CBC W/AUTO DIFF WBC: CPT

## 2024-08-20 PROBLEM — E66.01 OBESITY, CLASS III, BMI 40-49.9 (MORBID OBESITY): Status: ACTIVE | Noted: 2024-08-20

## 2024-08-20 NOTE — HISTORY OF PRESENT ILLNESS
[FreeTextEntry1] : 44 year old male with nocternal epilepsy, obesity on cpap here for CPE. [de-identified] : 44 year old male with nocternal epilepsy, obesity on cpap here for CPE. Patient twisted his knee at work a few months ago. While currently he can walk, he has pain particularly when he stands up. He also is under a lot of stress at home. For work, he is an [employer] employee but he dis unable to wear his ear protection because otherwise he cannot communicate with his colleagues. Lastly, he wishes to quit smoking but expresses that he does not wish to start a medication to help him do so. Lastly, in his chart it is noted that he has HTN. Patient states that that was one time. Today his bp was 100/60 ROS otherwise negative.

## 2024-08-20 NOTE — PHYSICAL EXAM
[Normal] : affect was normal and insight and judgment were intact [de-identified] : right knee pain

## 2024-08-20 NOTE — ASSESSMENT
[FreeTextEntry1] : Patient is a 44 year old male with nocternal epilepsy, obesity, ERICA on CPAP here for a CPE # ERICA -Counseled patient about the importance of his CPAP (had stated that his use has declined recently)  #HCM -Recommended Vivek Pedraza's "Easy Way" smoking session method as patient declined medication -H Pylori due to brother's history of gastric cancer -CMP, CBC, A1C  #ENT -Sent referral for audiologist for "musician's ear plugs" so that patient can protect his hearing, while still being able to hear coworkers  #Knee Pain -Orthopedic referral  RTC in year

## 2024-08-22 LAB
ALBUMIN SERPL ELPH-MCNC: 4.5 G/DL
ALP BLD-CCNC: 56 U/L
ALT SERPL-CCNC: 28 U/L
ANION GAP SERPL CALC-SCNC: 13 MMOL/L
AST SERPL-CCNC: 27 U/L
BASOPHILS # BLD AUTO: 0.06 K/UL
BASOPHILS NFR BLD AUTO: 0.7 %
BILIRUB SERPL-MCNC: 0.2 MG/DL
BUN SERPL-MCNC: 13 MG/DL
CALCIUM SERPL-MCNC: 9.6 MG/DL
CHLORIDE SERPL-SCNC: 103 MMOL/L
CO2 SERPL-SCNC: 25 MMOL/L
CREAT SERPL-MCNC: 1.06 MG/DL
EGFR: 90 ML/MIN/1.73M2
EOSINOPHIL # BLD AUTO: 0.11 K/UL
EOSINOPHIL NFR BLD AUTO: 1.3 %
ESTIMATED AVERAGE GLUCOSE: 117 MG/DL
GLUCOSE SERPL-MCNC: 79 MG/DL
HBA1C MFR BLD HPLC: 5.7 %
HCT VFR BLD CALC: 43.9 %
HGB BLD-MCNC: 13.9 G/DL
IMM GRANULOCYTES NFR BLD AUTO: 0.2 %
LYMPHOCYTES # BLD AUTO: 3.97 K/UL
LYMPHOCYTES NFR BLD AUTO: 48.5 %
MAN DIFF?: NORMAL
MCHC RBC-ENTMCNC: 26.3 PG
MCHC RBC-ENTMCNC: 31.7 GM/DL
MCV RBC AUTO: 83 FL
MONOCYTES # BLD AUTO: 0.6 K/UL
MONOCYTES NFR BLD AUTO: 7.3 %
NEUTROPHILS # BLD AUTO: 3.42 K/UL
NEUTROPHILS NFR BLD AUTO: 42 %
PLATELET # BLD AUTO: 295 K/UL
POTASSIUM SERPL-SCNC: 4.6 MMOL/L
PROT SERPL-MCNC: 7.1 G/DL
RBC # BLD: 5.29 M/UL
RBC # FLD: 15.9 %
SODIUM SERPL-SCNC: 141 MMOL/L
WBC # FLD AUTO: 8.18 K/UL

## 2024-08-26 DIAGNOSIS — E66.01 MORBID (SEVERE) OBESITY DUE TO EXCESS CALORIES: ICD-10-CM

## 2024-08-26 DIAGNOSIS — G47.33 OBSTRUCTIVE SLEEP APNEA (ADULT) (PEDIATRIC): ICD-10-CM

## 2024-08-26 DIAGNOSIS — M25.561 PAIN IN RIGHT KNEE: ICD-10-CM

## 2024-08-26 DIAGNOSIS — G40.802 OTHER EPILEPSY, NOT INTRACTABLE, WITHOUT STATUS EPILEPTICUS: ICD-10-CM

## 2024-08-26 DIAGNOSIS — Z00.00 ENCOUNTER FOR GENERAL ADULT MEDICAL EXAMINATION WITHOUT ABNORMAL FINDINGS: ICD-10-CM

## 2024-08-26 DIAGNOSIS — F17.210 NICOTINE DEPENDENCE, CIGARETTES, UNCOMPLICATED: ICD-10-CM

## 2025-02-11 NOTE — CONSULT NOTE ADULT - PROBLEM SELECTOR RECOMMENDATION 9
Follow up EEG   Orders per EMU team   Tele findings most consistent with artifact. No Afib thus far   will follow up Patient tolerated procedure well.

## 2025-05-04 ENCOUNTER — INPATIENT (INPATIENT)
Facility: HOSPITAL | Age: 45
LOS: 0 days | Discharge: HOME | DRG: 446 | End: 2025-05-05
Attending: SURGERY | Admitting: SURGERY
Payer: COMMERCIAL

## 2025-05-04 VITALS
TEMPERATURE: 98 F | HEART RATE: 58 BPM | SYSTOLIC BLOOD PRESSURE: 156 MMHG | WEIGHT: 315 LBS | RESPIRATION RATE: 17 BRPM | OXYGEN SATURATION: 100 % | HEIGHT: 71 IN | DIASTOLIC BLOOD PRESSURE: 96 MMHG

## 2025-05-04 DIAGNOSIS — Z98.890 OTHER SPECIFIED POSTPROCEDURAL STATES: Chronic | ICD-10-CM

## 2025-05-04 DIAGNOSIS — K80.20 CALCULUS OF GALLBLADDER WITHOUT CHOLECYSTITIS WITHOUT OBSTRUCTION: ICD-10-CM

## 2025-05-04 LAB
ADD ON TEST-SPECIMEN IN LAB: SIGNIFICANT CHANGE UP
ALBUMIN SERPL ELPH-MCNC: 4.4 G/DL — SIGNIFICANT CHANGE UP (ref 3.3–5)
ALP SERPL-CCNC: 48 U/L — SIGNIFICANT CHANGE UP (ref 40–120)
ALT FLD-CCNC: 24 U/L — SIGNIFICANT CHANGE UP (ref 10–45)
ANION GAP SERPL CALC-SCNC: 13 MMOL/L — SIGNIFICANT CHANGE UP (ref 5–17)
APTT BLD: 28 SEC — SIGNIFICANT CHANGE UP (ref 26.1–36.8)
AST SERPL-CCNC: 25 U/L — SIGNIFICANT CHANGE UP (ref 10–40)
BASOPHILS # BLD AUTO: 0.03 K/UL — SIGNIFICANT CHANGE UP (ref 0–0.2)
BASOPHILS NFR BLD AUTO: 0.3 % — SIGNIFICANT CHANGE UP (ref 0–2)
BILIRUB SERPL-MCNC: 0.2 MG/DL — SIGNIFICANT CHANGE UP (ref 0.2–1.2)
BUN SERPL-MCNC: 10 MG/DL — SIGNIFICANT CHANGE UP (ref 7–23)
CALCIUM SERPL-MCNC: 9.7 MG/DL — SIGNIFICANT CHANGE UP (ref 8.4–10.5)
CHLORIDE SERPL-SCNC: 98 MMOL/L — SIGNIFICANT CHANGE UP (ref 96–108)
CO2 SERPL-SCNC: 23 MMOL/L — SIGNIFICANT CHANGE UP (ref 22–31)
CREAT SERPL-MCNC: 0.96 MG/DL — SIGNIFICANT CHANGE UP (ref 0.5–1.3)
EGFR: 101 ML/MIN/1.73M2 — SIGNIFICANT CHANGE UP
EGFR: 101 ML/MIN/1.73M2 — SIGNIFICANT CHANGE UP
EOSINOPHIL # BLD AUTO: 0.01 K/UL — SIGNIFICANT CHANGE UP (ref 0–0.5)
EOSINOPHIL NFR BLD AUTO: 0.1 % — SIGNIFICANT CHANGE UP (ref 0–6)
GAS PNL BLDV: SIGNIFICANT CHANGE UP
GAS PNL BLDV: SIGNIFICANT CHANGE UP
GLUCOSE SERPL-MCNC: 105 MG/DL — HIGH (ref 70–99)
HCT VFR BLD CALC: 42.6 % — SIGNIFICANT CHANGE UP (ref 39–50)
HGB BLD-MCNC: 13.9 G/DL — SIGNIFICANT CHANGE UP (ref 13–17)
IMM GRANULOCYTES NFR BLD AUTO: 0.5 % — SIGNIFICANT CHANGE UP (ref 0–0.9)
INR BLD: 0.94 RATIO — SIGNIFICANT CHANGE UP (ref 0.85–1.16)
LIDOCAIN IGE QN: 19 U/L — SIGNIFICANT CHANGE UP (ref 7–60)
LYMPHOCYTES # BLD AUTO: 1.72 K/UL — SIGNIFICANT CHANGE UP (ref 1–3.3)
LYMPHOCYTES # BLD AUTO: 18.7 % — SIGNIFICANT CHANGE UP (ref 13–44)
MCHC RBC-ENTMCNC: 26.7 PG — LOW (ref 27–34)
MCHC RBC-ENTMCNC: 32.6 G/DL — SIGNIFICANT CHANGE UP (ref 32–36)
MCV RBC AUTO: 81.9 FL — SIGNIFICANT CHANGE UP (ref 80–100)
MONOCYTES # BLD AUTO: 0.46 K/UL — SIGNIFICANT CHANGE UP (ref 0–0.9)
MONOCYTES NFR BLD AUTO: 5 % — SIGNIFICANT CHANGE UP (ref 2–14)
NEUTROPHILS # BLD AUTO: 6.92 K/UL — SIGNIFICANT CHANGE UP (ref 1.8–7.4)
NEUTROPHILS NFR BLD AUTO: 75.4 % — SIGNIFICANT CHANGE UP (ref 43–77)
NRBC BLD AUTO-RTO: 0 /100 WBCS — SIGNIFICANT CHANGE UP (ref 0–0)
PLATELET # BLD AUTO: 300 K/UL — SIGNIFICANT CHANGE UP (ref 150–400)
POTASSIUM SERPL-MCNC: 4.2 MMOL/L — SIGNIFICANT CHANGE UP (ref 3.5–5.3)
POTASSIUM SERPL-SCNC: 4.2 MMOL/L — SIGNIFICANT CHANGE UP (ref 3.5–5.3)
PROT SERPL-MCNC: 7.5 G/DL — SIGNIFICANT CHANGE UP (ref 6–8.3)
PROTHROM AB SERPL-ACNC: 10.7 SEC — SIGNIFICANT CHANGE UP (ref 9.9–13.4)
RBC # BLD: 5.2 M/UL — SIGNIFICANT CHANGE UP (ref 4.2–5.8)
RBC # FLD: 14.6 % — HIGH (ref 10.3–14.5)
SODIUM SERPL-SCNC: 134 MMOL/L — LOW (ref 135–145)
WBC # BLD: 9.19 K/UL — SIGNIFICANT CHANGE UP (ref 3.8–10.5)
WBC # FLD AUTO: 9.19 K/UL — SIGNIFICANT CHANGE UP (ref 3.8–10.5)

## 2025-05-04 PROCEDURE — 47562 LAPAROSCOPIC CHOLECYSTECTOMY: CPT

## 2025-05-04 PROCEDURE — 76705 ECHO EXAM OF ABDOMEN: CPT | Mod: 26

## 2025-05-04 PROCEDURE — 93010 ELECTROCARDIOGRAM REPORT: CPT

## 2025-05-04 PROCEDURE — 88304 TISSUE EXAM BY PATHOLOGIST: CPT | Mod: 26

## 2025-05-04 PROCEDURE — 71046 X-RAY EXAM CHEST 2 VIEWS: CPT | Mod: 26

## 2025-05-04 PROCEDURE — 93308 TTE F-UP OR LMTD: CPT | Mod: 26

## 2025-05-04 PROCEDURE — 99285 EMERGENCY DEPT VISIT HI MDM: CPT

## 2025-05-04 RX ORDER — ENOXAPARIN SODIUM 100 MG/ML
40 INJECTION SUBCUTANEOUS EVERY 24 HOURS
Refills: 0 | Status: DISCONTINUED | OUTPATIENT
Start: 2025-05-05 | End: 2025-05-05

## 2025-05-04 RX ORDER — LEVETIRACETAM 10 MG/ML
750 INJECTION, SOLUTION INTRAVENOUS ONCE
Refills: 0 | Status: COMPLETED | OUTPATIENT
Start: 2025-05-04 | End: 2025-05-04

## 2025-05-04 RX ORDER — MAGNESIUM, ALUMINUM HYDROXIDE 200-200 MG
30 TABLET,CHEWABLE ORAL ONCE
Refills: 0 | Status: COMPLETED | OUTPATIENT
Start: 2025-05-04 | End: 2025-05-04

## 2025-05-04 RX ORDER — ACETAMINOPHEN 500 MG/5ML
975 LIQUID (ML) ORAL EVERY 6 HOURS
Refills: 0 | Status: DISCONTINUED | OUTPATIENT
Start: 2025-05-04 | End: 2025-05-05

## 2025-05-04 RX ORDER — SODIUM CHLORIDE 9 G/1000ML
1000 INJECTION, SOLUTION INTRAVENOUS
Refills: 0 | Status: DISCONTINUED | OUTPATIENT
Start: 2025-05-04 | End: 2025-05-04

## 2025-05-04 RX ORDER — ENOXAPARIN SODIUM 100 MG/ML
40 INJECTION SUBCUTANEOUS EVERY 24 HOURS
Refills: 0 | Status: CANCELLED | OUTPATIENT
Start: 2025-05-05 | End: 2025-05-04

## 2025-05-04 RX ORDER — ACETAMINOPHEN 500 MG/5ML
1000 LIQUID (ML) ORAL ONCE
Refills: 0 | Status: COMPLETED | OUTPATIENT
Start: 2025-05-04 | End: 2025-05-04

## 2025-05-04 RX ORDER — PIPERACILLIN-TAZO-DEXTROSE,ISO 2.25G/50ML
3.38 IV SOLUTION, PIGGYBACK PREMIX FROZEN(ML) INTRAVENOUS ONCE
Refills: 0 | Status: DISCONTINUED | OUTPATIENT
Start: 2025-05-04 | End: 2025-05-04

## 2025-05-04 RX ORDER — HYDROMORPHONE/SOD CHLOR,ISO/PF 2 MG/10 ML
0.5 SYRINGE (ML) INJECTION
Refills: 0 | Status: DISCONTINUED | OUTPATIENT
Start: 2025-05-04 | End: 2025-05-04

## 2025-05-04 RX ORDER — SODIUM CHLORIDE 9 G/1000ML
1000 INJECTION, SOLUTION INTRAVENOUS ONCE
Refills: 0 | Status: COMPLETED | OUTPATIENT
Start: 2025-05-04 | End: 2025-05-04

## 2025-05-04 RX ORDER — IBUPROFEN 200 MG
600 TABLET ORAL EVERY 6 HOURS
Refills: 0 | Status: DISCONTINUED | OUTPATIENT
Start: 2025-05-04 | End: 2025-05-05

## 2025-05-04 RX ORDER — ONDANSETRON HCL/PF 4 MG/2 ML
4 VIAL (ML) INJECTION ONCE
Refills: 0 | Status: COMPLETED | OUTPATIENT
Start: 2025-05-04 | End: 2025-05-04

## 2025-05-04 RX ORDER — OXYCODONE HYDROCHLORIDE 30 MG/1
5 TABLET ORAL EVERY 6 HOURS
Refills: 0 | Status: DISCONTINUED | OUTPATIENT
Start: 2025-05-04 | End: 2025-05-05

## 2025-05-04 RX ORDER — LEVETIRACETAM 10 MG/ML
750 INJECTION, SOLUTION INTRAVENOUS
Refills: 0 | Status: CANCELLED | OUTPATIENT
Start: 2025-05-05 | End: 2025-05-04

## 2025-05-04 RX ORDER — LEVETIRACETAM 10 MG/ML
750 INJECTION, SOLUTION INTRAVENOUS
Refills: 0 | Status: DISCONTINUED | OUTPATIENT
Start: 2025-05-05 | End: 2025-05-05

## 2025-05-04 RX ADMIN — Medication 30 MILLILITER(S): at 15:10

## 2025-05-04 RX ADMIN — SODIUM CHLORIDE 1000 MILLILITER(S): 9 INJECTION, SOLUTION INTRAVENOUS at 16:35

## 2025-05-04 RX ADMIN — Medication 4 MILLIGRAM(S): at 14:37

## 2025-05-04 RX ADMIN — Medication 4 MILLIGRAM(S): at 16:52

## 2025-05-04 RX ADMIN — Medication 1000 MILLIGRAM(S): at 16:15

## 2025-05-04 RX ADMIN — SODIUM CHLORIDE 75 MILLILITER(S): 9 INJECTION, SOLUTION INTRAVENOUS at 21:46

## 2025-05-04 RX ADMIN — Medication 1000 MILLIGRAM(S): at 15:45

## 2025-05-04 RX ADMIN — LEVETIRACETAM 750 MILLIGRAM(S): 10 INJECTION, SOLUTION INTRAVENOUS at 14:37

## 2025-05-04 RX ADMIN — Medication 20 MILLIGRAM(S): at 14:37

## 2025-05-04 RX ADMIN — SODIUM CHLORIDE 1000 MILLILITER(S): 9 INJECTION, SOLUTION INTRAVENOUS at 15:28

## 2025-05-04 RX ADMIN — Medication 400 MILLIGRAM(S): at 15:29

## 2025-05-04 NOTE — ED ADULT NURSE REASSESSMENT NOTE - SYMPTOMS
back pain/abdominal pain
nausea slightly improved/abdominal pain/chest pain
abdominal pain/chest pain

## 2025-05-04 NOTE — PATIENT PROFILE ADULT - FALL HARM RISK - HARM RISK INTERVENTIONS

## 2025-05-04 NOTE — ED PROVIDER NOTE - CLINICAL SUMMARY MEDICAL DECISION MAKING FREE TEXT BOX
Attending Yaa Savage: 45 yo male with h/o seizures presenting with abdominal and chest pain. upon arrival pt found to be bradycardic. ekg with sinus bradycardia without st elevation. on exam pt with sig RUQ and epigastric ttp. pocus performed showing gallbladder with SIN, and stone with sludge in fundus with areas of wall edema. concern for acute cholecystitis. pt given GI cocktail without improvement and surgery consulted. less likely aortic dissection. ext wwp and no back pain. pt will need admission

## 2025-05-04 NOTE — ED PROVIDER NOTE - OBJECTIVE STATEMENT
43yo M with PMH of seizures (on keppra, last seizure 2021) presenting with CP since yesterday, Reports CP is pressure-like, substernal, radiates to L side of chest and occasionally to his back. Reports pain was intermittent yesterday and improved with gas medication .Today has had pain since 6am with no relief. Pain feels better with slow deep breaths. No associated with food, has not eaten today. +associated mild nausea. +smokes cigarettes x 20 years. Did not take home keppra today. Denies fever/chills, cough, recent illness, SOB, abdominal pain, vomiting, diarrhea, dysuria. 43yo M with PMH of seizures (on keppra, last seizure 2021) presenting with CP since yesterday. Reports CP is pressure-like, substernal, radiates to L side of chest and occasionally to his back. Reports pain was intermittent yesterday and improved with gas medication .Today has had pain since 6am with no relief. Pain feels better with slow deep breaths. No association with food, has not eaten today. +associated mild nausea. +smokes cigarettes x 20 years. Did not take home keppra today. Denies fever/chills, cough, recent illness, SOB, abdominal pain, vomiting, diarrhea, dysuria.

## 2025-05-04 NOTE — ED ADULT NURSE NOTE - OBJECTIVE STATEMENT
Pt c/o "pain to epigastric area, anterior chest and back since yesterday. Yesterday it was relieved with Gas-x but not today. Some nausea and sob at times."

## 2025-05-04 NOTE — ED PROVIDER NOTE - ATTENDING APP SHARED VISIT CONTRIBUTION OF CARE
Attending MD Yaa Savage:  I personally made/approved the management plan and take responsibility for the patient management.  Physician assistant note reviewed and agree on plan of care and except where noted.  See HPI, PE, and MDM for details.

## 2025-05-04 NOTE — PATIENT PROFILE ADULT - CENTRAL VENOUS CATHETER/PICC LINE
Pt reports being independent, lives alone, and has [address] and another 12 stairs within home. no

## 2025-05-04 NOTE — ED PROVIDER NOTE - PROGRESS NOTE DETAILS
RUQ POCUS by Dr. Savage revealing stone in neck, surgery called for consult. - Gayatri Padilla PA-C Attending Yaa Savage: Patient with persistent pain and tenderness to palpation in RUQ.  Surgery aware. D/w Surgery, initial plan for HIDA however pt seen by Dr. Galan and plan for admission for lap chava. - CHEMO RaymundoC

## 2025-05-04 NOTE — BRIEF OPERATIVE NOTE - OPERATION/FINDINGS
Acutely inflamed gallbladder with thick rind and pericholecystic edema s/p standard 4 port lap chava.

## 2025-05-04 NOTE — ED PROVIDER NOTE - PHYSICAL EXAMINATION
Attending Yaa Savage: Gen: NAD, heent: atrauamtic, eomi, perrla, mmm, op pink, uvula midline, s, cvbradycardia, lungs: ctab, abd: soft,ttp RUQ and epiagstirc area, no guarding, ext: wwp, neg homans, skin: no rash, neuro: awake and alert, following commands, speech clear, sensation and strength intact, no focal deficits

## 2025-05-04 NOTE — H&P ADULT - HISTORY OF PRESENT ILLNESS
ACS TEAM SURGERY H&P NOTE  KENNY MARIEE  |  [id number]  |  05-04-25 @ 16:46    CC: Patient is a 44y old  Male who presents with a chief complaint of     HPI: 76M 45yo M with PMH of seizures (on keppra, dx 2019, last seizure 2021) presenting with lower central chest pain since yesterday, Reports CP is pressure-like, substernal, radiates occasionally to his back. Reports pain was intermittent yesterday and improved. Today has had pain since 6am with no relief. Not associated with food, has not eaten today. +associated mild nausea. +smokes cigarettes x 20 years. Did not take home keppra today. Never had pain like this in the past, no prior symptoms classic for biliary colic.     INTERVAL EVENTS: In the ED, hemodynamically normal, afebrile. Labs with Labs with LFTs/WBC wnl. Bedside RUQ US by ED concerning for gallstones.  Surgery consulted.    ACS TEAM SURGERY H&P NOTE  KENNY MARIEE  |  [id number]  |  05-04-25 @ 16:46    CC: Patient is a 44y old  Male who presents with a chief complaint of     HPI: 76M 45yo M with PMH of seizures (on keppra, dx 2019, last seizure 2021) presenting with lower central chest pain since yesterday, Reports CP is pressure-like, substernal, radiates occasionally to his back. Reports pain was intermittent yesterday and improved. Today has had pain since 6am with no relief. Not associated with food, has not eaten today. +associated mild nausea. +smokes cigarettes x 20 years. Did not take home keppra today. Never had pain like this in the past, no prior symptoms classic for biliary colic.     INTERVAL EVENTS: In the ED, hemodynamically normal, afebrile. Labs with Labs with LFTs/WBC wnl. Bedside RUQ US by ED concerning for gallstones.  Surgery consulted.

## 2025-05-04 NOTE — CONSULT NOTE ADULT - SUBJECTIVE AND OBJECTIVE BOX
__ TEAM SURGERY CONSULT NOTE  KENNY MARIEE  |  [id number]  |  05-04-25 @ 16:46    CC: Patient is a 44y old  Male who presents with a chief complaint of     HPI: 76M 45yo M with PMH of seizures (on keppra, dx 2019, last seizure 2021) presenting with lower central chest pain since yesterday, Reports CP is pressure-like, substernal, radiates occasionally to his back. Reports pain was intermittent yesterday and improved. Today has had pain since 6am with no relief. Not associated with food, has not eaten today. +associated mild nausea. +smokes cigarettes x 20 years. Did not take home keppra today. Never had pain like this in the past, no prior symptoms classic for biliary colic.     INTERVAL EVENTS: In the ED, hemodynamically normal, afebrile. Labs with Labs with LFTs/WBC wnl. Bedside RUQ US by ED concerning for gallstones.  Surgery consulted.     REVIEW OF SYSTEMS:  General: denies weight change, fever or fatigue  HEENT: denies sore throat, hoarseness  Respiratory: denies cough, shortness of breath at rest and on exertion, wheezing  Cardiovascular: denies chest pain, abnormal heart rhythm, PND, palpitations  Gastrointestinal: denies nausea, vomiting, diarrhea, bloody or black bowel movements  Genitourinary: denies frequent urination, painful urination, kidney disease  Neurological: denies seizures, headaches  Muscoloskeletal: denies any joint pains  Psychiatric: denies depression, anxiety    PAST MEDICAL & SURGICAL HISTORY:  History of seizure  Surgical history: knee  No abdominal surgeries    MEDICATIONS  (STANDING):  morphine  - Injectable 4 milliGRAM(s) IV Push Once    MEDICATIONS  (home): 750mg Keppra BID     Allergies  No Known Allergies    SOCIAL HISTORY: 1ppd smoking history, social ETOH use    FAMILY HISTORY: noncontributory    Objective:   Vital Signs Last 24 Hrs  T(C): 36.6 (04 May 2025 13:27), Max: 36.6 (04 May 2025 13:27)  T(F): 97.9 (04 May 2025 13:27), Max: 97.9 (04 May 2025 13:27)  HR: 58 (04 May 2025 13:27) (58 - 58)  BP: 156/96 (04 May 2025 13:27) (156/96 - 156/96)  BP(mean): --  RR: 17 (04 May 2025 13:27) (17 - 17)  SpO2: 100% (04 May 2025 13:27) (100% - 100%)      Physical Exam:  General: NAD, resting comfortably   CV: regular rate and rhythm   Pulm: no increased work of breathing   Abdomen: soft, mild epigastric tenderness, nondistended, no rebound or guarding, negative murphys sign    Extremities: warm and well perfused      LABS:                        13.9   9.19  )-----------( 300      ( 04 May 2025 14:38 )             42.6     05-04    134[L]  |  98  |  10  ----------------------------<  105[H]  4.2   |  23  |  0.96    Ca    9.7      04 May 2025 14:38    TPro  7.5  /  Alb  4.4  /  TBili  0.2  /  DBili  x   /  AST  25  /  ALT  24  /  AlkPhos  48  05-04    PT/INR - ( 04 May 2025 14:59 )   PT: 10.7 sec;   INR: 0.94 ratio         PTT - ( 04 May 2025 14:59 )  PTT:28.0 sec  LIVER FUNCTIONS - ( 04 May 2025 14:38 )  Alb: 4.4 g/dL / Pro: 7.5 g/dL / ALK PHOS: 48 U/L / ALT: 24 U/L / AST: 25 U/L / GGT: x           Urinalysis Basic - ( 04 May 2025 14:38 )    Color: x / Appearance: x / SG: x / pH: x  Gluc: 105 mg/dL / Ketone: x  / Bili: x / Urobili: x   Blood: x / Protein: x / Nitrite: x   Leuk Esterase: x / RBC: x / WBC x   Sq Epi: x / Non Sq Epi: x / Bacteria: x        RADIOLOGY & ADDITIONAL STUDIES:    < from: POCUS ED Abdomen Limited (05.04.25 @ 14:50) >    INTERPRETATION:  Grayscale imaging of the right upper quadrant was   performed.  The gallbladder was visualized and scanned in longitudinal and transverse   planes.  The anterior gallbladder wall measured  0.53.cm.  The common bile duct measured 0.4.cm.  Gallstonespresent.  Sludge present.  Pericholecystic fluid not present.  Gallbladder wall edema present.  Sonographic Ernst's sign present.    IMPRESSION:Gallbladder with stones, one of which is impacted in the neck.   There was mild gallbladder wall edema seen. Findings concerning for acute   cholecystitis

## 2025-05-04 NOTE — H&P ADULT - NSHPLABSRESULTS_GEN_ALL_CORE
LABS:                        13.9   9.19  )-----------( 300      ( 04 May 2025 14:38 )             42.6     05-04    134[L]  |  98  |  10  ----------------------------<  105[H]  4.2   |  23  |  0.96    Ca    9.7      04 May 2025 14:38    TPro  7.5  /  Alb  4.4  /  TBili  0.2  /  DBili  x   /  AST  25  /  ALT  24  /  AlkPhos  48  05-04    PT/INR - ( 04 May 2025 14:59 )   PT: 10.7 sec;   INR: 0.94 ratio         PTT - ( 04 May 2025 14:59 )  PTT:28.0 sec  LIVER FUNCTIONS - ( 04 May 2025 14:38 )  Alb: 4.4 g/dL / Pro: 7.5 g/dL / ALK PHOS: 48 U/L / ALT: 24 U/L / AST: 25 U/L / GGT: x           Urinalysis Basic - ( 04 May 2025 14:38 )    Color: x / Appearance: x / SG: x / pH: x  Gluc: 105 mg/dL / Ketone: x  / Bili: x / Urobili: x   Blood: x / Protein: x / Nitrite: x   Leuk Esterase: x / RBC: x / WBC x   Sq Epi: x / Non Sq Epi: x / Bacteria: x    RADIOLOGY & ADDITIONAL STUDIES:    < from: POCUS ED Abdomen Limited (05.04.25 @ 14:50) >    INTERPRETATION:  Grayscale imaging of the right upper quadrant was   performed.  The gallbladder was visualized and scanned in longitudinal and transverse   planes.  The anterior gallbladder wall measured  0.53.cm.  The common bile duct measured 0.4.cm.  Gallstonespresent.  Sludge present.  Pericholecystic fluid not present.  Gallbladder wall edema present.  Sonographic Ernst's sign present.    IMPRESSION:Gallbladder with stones, one of which is impacted in the neck.   There was mild gallbladder wall edema seen. Findings concerning for acute   cholecystitis

## 2025-05-04 NOTE — ED ADULT NURSE REASSESSMENT NOTE - COMFORT CARE
plan of care explained/treatment delay explained
plan of care explained/wait time explained
plan of care explained/wait time explained

## 2025-05-04 NOTE — CONSULT NOTE ADULT - ASSESSMENT
Assessment: 44M PMH obesity (BMI 45), seizure disorder, p/w 1d chest pain/epigastric pain not associated with PO intake, with US concerning for possible stone in GB neck. Patient currently with normal labs (nml WBC, nml LFTs), reassuring abdominal exam, and US without significant GB wall thickening or pericholecystic fluid. Clinical history and exam not completely consistent with acute cholecystitis.     Recommendations:   - Given uncertainty of underlying diagnosis, recommend HIDA scan to more definitely rule in/out acute cholecystitis   - ACS workup per ED given initial complaint of chest pain     Further recommendations pending results of HIDA scan     Discussed with Dr. Galan     Please contact ACS Surgery ([phone]) with any questions.     Merari Jiménez DO, PhD  Surgery, ACS Team   [phone]  [facility]  Assessment: 44M PMH obesity (BMI 45), seizure disorder, p/w 1d chest pain/epigastric pain not associated with PO intake, with US concerning for possible stone in GB neck. Patient currently with normal labs (nml WBC, nml LFTs), reassuring abdominal exam, and US without significant GB wall thickening or pericholecystic fluid. Clinical history and exam not completely consistent with acute cholecystitis.     Recommendations:   - Pending attending discussion     Surgery, ACS Team   [phone]  [facility]

## 2025-05-04 NOTE — H&P ADULT - NSHPPHYSICALEXAM_GEN_ALL_CORE
Objective:   Vital Signs Last 24 Hrs  T(C): 36.6 (04 May 2025 13:27), Max: 36.6 (04 May 2025 13:27)  T(F): 97.9 (04 May 2025 13:27), Max: 97.9 (04 May 2025 13:27)  HR: 58 (04 May 2025 13:27) (58 - 58)  BP: 156/96 (04 May 2025 13:27) (156/96 - 156/96)  BP(mean): --  RR: 17 (04 May 2025 13:27) (17 - 17)  SpO2: 100% (04 May 2025 13:27) (100% - 100%)      Physical Exam:  General: NAD, resting comfortably   CV: regular rate and rhythm   Pulm: no increased work of breathing   Abdomen: soft, mild epigastric tenderness, nondistended, no rebound or guarding, negative murphys sign    Extremities: warm and well perfused

## 2025-05-04 NOTE — H&P ADULT - NSICDXFAMILYHX_GEN_ALL_CORE_FT
Pt called back and believes there could have been a mix up of her and her husbands A1C test, She sounded upset on the phone. They are coming to get retested on 12/17   FAMILY HISTORY:  Family history of prostate cancer in father    Mother  Still living? Unknown  FH: lung cancer, Age at diagnosis: Age Unknown    Sibling  Still living? Unknown  FH: gastric cancer, Age at diagnosis: Age Unknown

## 2025-05-05 ENCOUNTER — TRANSCRIPTION ENCOUNTER (OUTPATIENT)
Age: 45
End: 2025-05-05

## 2025-05-05 VITALS
RESPIRATION RATE: 18 BRPM | DIASTOLIC BLOOD PRESSURE: 88 MMHG | OXYGEN SATURATION: 98 % | SYSTOLIC BLOOD PRESSURE: 127 MMHG | TEMPERATURE: 98 F | HEART RATE: 94 BPM

## 2025-05-05 PROCEDURE — 85018 HEMOGLOBIN: CPT

## 2025-05-05 PROCEDURE — 96365 THER/PROPH/DIAG IV INF INIT: CPT

## 2025-05-05 PROCEDURE — 96375 TX/PRO/DX INJ NEW DRUG ADDON: CPT

## 2025-05-05 PROCEDURE — 82947 ASSAY GLUCOSE BLOOD QUANT: CPT

## 2025-05-05 PROCEDURE — 85730 THROMBOPLASTIN TIME PARTIAL: CPT

## 2025-05-05 PROCEDURE — 85014 HEMATOCRIT: CPT

## 2025-05-05 PROCEDURE — 83605 ASSAY OF LACTIC ACID: CPT

## 2025-05-05 PROCEDURE — 84295 ASSAY OF SERUM SODIUM: CPT

## 2025-05-05 PROCEDURE — 80053 COMPREHEN METABOLIC PANEL: CPT

## 2025-05-05 PROCEDURE — 83690 ASSAY OF LIPASE: CPT

## 2025-05-05 PROCEDURE — 71046 X-RAY EXAM CHEST 2 VIEWS: CPT

## 2025-05-05 PROCEDURE — 85025 COMPLETE CBC W/AUTO DIFF WBC: CPT

## 2025-05-05 PROCEDURE — 93005 ELECTROCARDIOGRAM TRACING: CPT

## 2025-05-05 PROCEDURE — 96361 HYDRATE IV INFUSION ADD-ON: CPT

## 2025-05-05 PROCEDURE — 84132 ASSAY OF SERUM POTASSIUM: CPT

## 2025-05-05 PROCEDURE — 84484 ASSAY OF TROPONIN QUANT: CPT

## 2025-05-05 PROCEDURE — C1889: CPT

## 2025-05-05 PROCEDURE — 76705 ECHO EXAM OF ABDOMEN: CPT

## 2025-05-05 PROCEDURE — 99285 EMERGENCY DEPT VISIT HI MDM: CPT | Mod: 25

## 2025-05-05 PROCEDURE — 82435 ASSAY OF BLOOD CHLORIDE: CPT

## 2025-05-05 PROCEDURE — 85610 PROTHROMBIN TIME: CPT

## 2025-05-05 PROCEDURE — 88304 TISSUE EXAM BY PATHOLOGIST: CPT

## 2025-05-05 PROCEDURE — C9399: CPT

## 2025-05-05 PROCEDURE — 93308 TTE F-UP OR LMTD: CPT

## 2025-05-05 PROCEDURE — 82803 BLOOD GASES ANY COMBINATION: CPT

## 2025-05-05 PROCEDURE — 82330 ASSAY OF CALCIUM: CPT

## 2025-05-05 RX ORDER — ACETAMINOPHEN 500 MG/5ML
3 LIQUID (ML) ORAL
Qty: 0 | Refills: 0 | DISCHARGE
Start: 2025-05-05

## 2025-05-05 RX ORDER — IBUPROFEN 200 MG
1 TABLET ORAL
Qty: 0 | Refills: 0 | DISCHARGE
Start: 2025-05-05

## 2025-05-05 RX ORDER — OXYCODONE HYDROCHLORIDE 30 MG/1
1 TABLET ORAL
Qty: 8 | Refills: 0
Start: 2025-05-05 | End: 2025-05-06

## 2025-05-05 RX ADMIN — LEVETIRACETAM 750 MILLIGRAM(S): 10 INJECTION, SOLUTION INTRAVENOUS at 05:00

## 2025-05-05 RX ADMIN — Medication 975 MILLIGRAM(S): at 04:37

## 2025-05-05 RX ADMIN — OXYCODONE HYDROCHLORIDE 5 MILLIGRAM(S): 30 TABLET ORAL at 05:00

## 2025-05-05 RX ADMIN — Medication 975 MILLIGRAM(S): at 00:05

## 2025-05-05 RX ADMIN — Medication 975 MILLIGRAM(S): at 08:20

## 2025-05-05 NOTE — DISCHARGE NOTE PROVIDER - NSDCMRMEDTOKEN_GEN_ALL_CORE_FT
acetaminophen 325 mg oral tablet: 3 tab(s) orally every 6 hours  ibuprofen 600 mg oral tablet: 1 tab(s) orally every 6 hours As needed Moderate Pain (4 - 6)  Keppra 750 mg oral tablet: 1 tab(s) orally 2 times a day  oxyCODONE 5 mg oral tablet: 1 tab(s) orally every 6 hours as needed for Severe Pain (7 - 10) MDD: 4

## 2025-05-05 NOTE — PROGRESS NOTE ADULT - ATTENDING COMMENTS
44M PMH obesity (BMI 45), seizure disorder, acute cholecystitis s/p lap cholecystectomy on 5/4/25.    Feels well. Pain controlled. Tolerating diet.     Abdomen soft, appropriately tender around incisions, lap incisions c/d/i     Ok for DC home  No lifting > 10 lbs   Follow up in office for post op visit      I have seen and examined this patient on rounds this morning with the surgery team. I have reviewed all new labs, imaging and reports. I have participated in formulating the plan for the day, and have read and agree with the history, ROS, exam, assessment and plan as stated above.        Erica Jones MD   Trauma and Acute Care Surgery

## 2025-05-05 NOTE — DISCHARGE NOTE NURSING/CASE MANAGEMENT/SOCIAL WORK - NSDCPEFALRISK_GEN_ALL_CORE
For information on Fall & Injury Prevention, visit: [email or web address] OR  [email or web address] OR  https://www.cdc.gov/steadi/patient.html
Statement Selected

## 2025-05-05 NOTE — DISCHARGE NOTE PROVIDER - HOSPITAL COURSE
43yo M with PMH of seizures (on keppra, dx 2019, last seizure 2021) presenting with lower central chest pain since yesterday, Reports CP is pressure-like, substernal, radiates occasionally to his back. Reports pain was intermittent yesterday and improved. Today has had pain since 6am with no relief. Not associated with food, has not eaten today. +associated mild nausea. +smokes cigarettes x 20 years. Did not take home keppra today. Never had pain like this in the past, no prior symptoms classic for biliary colic.     INTERVAL EVENTS: In the ED, hemodynamically normal, afebrile. Labs with Labs with LFTs/WBC wnl. Bedside RUQ US by ED concerning for gallstones.  Surgery consulted.     Pt was admitted under General Surgery for further evaluation and management. Pt was taken to the OR on 05/04/23, and is s/p laparoscopic cholecystectomy. The patient tolerated the procedure well (see operative report for full details). Pt was transferred to the PACU in stable condition. In the PACU, the patient's pain was controlled and vitals stable. On POC, the patient was doing well. The patient was transferred to the surgical floor in stable condition.  Diet was advanced as tolerated.     On the day of discharge, the patient's vitals are stable, pain is controlled, voiding urine, passing gas/stool, tolerating a diet, and ambulating well. Pt will f/u with Dr. Galan in 1-2 weeks. Pt will f/u with PCP in 1-2 weeks.

## 2025-05-05 NOTE — PROGRESS NOTE ADULT - ASSESSMENT
44M PMH obesity (BMI 45), seizure disorder, p/w 1d chest pain/epigastric pain not associated with PO intake, with US concerning for possible stone in GB neck. Patient currently with normal labs (nml WBC, nml LFTs), mild epigastric tenderness s.p lap cholecystectomy on 5/4/25. Pt recovering appropriately on the floor.      PLAN:  - Diet: Regular   - Analgesia and antiemetics as needed  - Strict I&O's  - Monitor bowel function   - Incentive spirometry  - OOB as tolerated  - DVT prophylaxis: SCD, lovenox   - Dispo: Discharge home in AM if tolerating diet.

## 2025-05-05 NOTE — DISCHARGE NOTE NURSING/CASE MANAGEMENT/SOCIAL WORK - FINANCIAL ASSISTANCE
[facility] provides services at a reduced cost to those who are determined to be eligible through [facility]’s financial assistance program. Information regarding [facility]’s financial assistance program can be found by going to [email or web address] or by calling [phone].

## 2025-05-05 NOTE — PROGRESS NOTE ADULT - SUBJECTIVE AND OBJECTIVE BOX
SURGERY DAILY PROGRESS NOTE    STATUS POST:  Laparoscopic cholecystectomy        SUBJECTIVE: Pt seen and examined at bedside. Pain well controlled, denies n/v.       OBJECTIVE:  Vital Signs Last 24 Hrs  T(C): 36.6 (05 May 2025 04:30), Max: 36.7 (04 May 2025 16:50)  T(F): 97.8 (05 May 2025 04:30), Max: 98 (04 May 2025 16:50)  HR: 83 (05 May 2025 04:30) (54 - 100)  BP: 126/76 (05 May 2025 04:30) (126/76 - 185/91)  BP(mean): 103 (04 May 2025 22:30) (90 - 129)  RR: 18 (05 May 2025 04:30) (14 - 18)  SpO2: 94% (05 May 2025 04:30) (94% - 100%)    Parameters below as of 05 May 2025 04:30  Patient On (Oxygen Delivery Method): room air        I&O's Summary    04 May 2025 07:01  -  05 May 2025 07:00  --------------------------------------------------------  IN: 150 mL / OUT: 1300 mL / NET: -1150 mL        Physical Exam:  General Appearance: Appears well, NAD, A& O x 3  Chest: Equal expansion bilaterally, equal breath sounds  Abdomen: Soft, nontense, appropriate incisional tenderness, dressings clean and dry and intact  Extremities: Grossly symmetric, SCD's in place     LABS:                        13.9   9.19  )-----------( 300      ( 04 May 2025 14:38 )             42.6     05-04    134[L]  |  98  |  10  ----------------------------<  105[H]  4.2   |  23  |  0.96    Ca    9.7      04 May 2025 14:38    TPro  7.5  /  Alb  4.4  /  TBili  0.2  /  DBili  x   /  AST  25  /  ALT  24  /  AlkPhos  48  05-04    PT/INR - ( 04 May 2025 14:59 )   PT: 10.7 sec;   INR: 0.94 ratio         PTT - ( 04 May 2025 14:59 )  PTT:28.0 sec  Urinalysis Basic - ( 04 May 2025 14:38 )    Color: x / Appearance: x / SG: x / pH: x  Gluc: 105 mg/dL / Ketone: x  / Bili: x / Urobili: x   Blood: x / Protein: x / Nitrite: x   Leuk Esterase: x / RBC: x / WBC x   Sq Epi: x / Non Sq Epi: x / Bacteria: x        RADIOLOGY & ADDITIONAL STUDIES:

## 2025-05-05 NOTE — DISCHARGE NOTE PROVIDER - NSDCCPTREATMENT_GEN_ALL_CORE_FT
PRINCIPAL PROCEDURE  Procedure: Laparoscopic cholecystectomy  Findings and Treatment: WOUND CARE: You may shower. Do not scrub incision. Pat Dry abdomen. Leave the white steri strips in place, they will fall off on their own in approximately 5-7 days.  MEDICATIONS: Take tylenol 975 mg every 6 hours alternating with motrin 400-600 mg every 6 hours for pain for next 2 days then take medications as needed. Take Oxycodone as needed for severe breakthrough pain. DO NOT DRIVE OR DRINK ALCOHOL WHILE TAKING MEDICATION. IT CAN CAUSE DROWSINESS.   BATHING: Please do not submerge wound underwater. You may shower and/or sponge bathe.  ACTIVITY: No heavy lifting anything more than 10-15lbs or straining. Otherwise, you may return to your usual level of physical activity. If you are taking narcotic pain medication (such as Percocet), do NOT drive a car, operate machinery or make important decisions.  DIET: Regular diet   NOTIFY YOUR SURGEON IF: You have any bleeding that does not stop, any pus draining from your wound, any fever (over 100.4 F) or chills, persistent nausea/vomiting with inability to tolerate food or liquids, persistent diarrhea, or if your pain is not controlled on your discharge pain medications.  FOLLOW-UP:  1. Please call to make a follow-up appointment within one week of discharge   2. Please follow up with your primary care physician in one week regarding your hospitalization.

## 2025-05-05 NOTE — DISCHARGE NOTE NURSING/CASE MANAGEMENT/SOCIAL WORK - PATIENT PORTAL LINK FT
You can access the FollowMyHealth Patient Portal offered by [facility] by registering at the following website: [email or web address]. By joining [facility]’s FollowMyHealth portal, you will also be able to view your health information using other applications (apps) compatible with our system.

## 2025-05-05 NOTE — DISCHARGE NOTE PROVIDER - CARE PROVIDER_API CALL
Jared Galan  Surgery  [address] [place] [address], [facility] [address]  [place] [address]  Phone: [phone]  Fax: [phone]  Follow Up Time: 2 weeks

## 2025-05-05 NOTE — CHART NOTE - NSCHARTNOTEFT_GEN_A_CORE
SURGERY POST OP CHECK    STATUS POST PROCEDURE: s/p lap cholecystomy     SUBJECTIVE: Pt seen and examined at bedside. Patient reports appropriate surgical incisional pain; pain is controlled. Denies fevers/chills, chest pain, dyspnea, nausea, vomiting   Pt has not passed gas or had bowel movement yet. Pt is voiding well. Pt is tolerating diet. Pt is ambulating well    --------------------------------------------------------------------------------------------------------------------------------------------------------------------------------------------------------------  OBJECTIVE:  Vital Signs Last 24 Hrs  T(C): 36.6 (05 May 2025 01:15), Max: 36.7 (04 May 2025 16:50)  T(F): 97.8 (05 May 2025 01:15), Max: 98 (04 May 2025 16:50)  HR: 100 (05 May 2025 01:15) (54 - 100)  BP: 132/77 (05 May 2025 01:15) (127/83 - 185/91)  BP(mean): 103 (04 May 2025 22:30) (90 - 129)  RR: 18 (05 May 2025 01:15) (14 - 18)  SpO2: 98% (05 May 2025 01:15) (94% - 100%)    Parameters below as of 05 May 2025 01:15  Patient On (Oxygen Delivery Method): room air      I&O's Summary    04 May 2025 07:01  -  05 May 2025 01:30  --------------------------------------------------------  IN: 150 mL / OUT: 300 mL / NET: -150 mL        PHYSICAL EXAM:  Constitutional: Well developed, well nourished, NAD  Chest: Symmetric chest rise bilaterally  Abdomen: Soft, nontender, nondistended, appropriate sx incisional tenderness with dressings c/d/i. No rebound or guarding.   Groin: Soft, nontender, no ecchymosis/hematoma, no erythema, no edema.  Extremities: Warm to touch, soft, normal strength, sensory and motor intact. No cyanosis/erythema/edema/hematoma  ----------------------------------------------------------------------------------------------------------------------------------------------------------------------------------------------------------------  ASSESSMENT: HPI:  44M PMH obesity (BMI 45), seizure disorder, p/w 1d chest pain/epigastric pain not associated with PO intake, with US concerning for possible stone in GB neck. Patient currently with normal labs (nml WBC, nml LFTs), mild epigastric tenderness s.p lap cholecystectomy on 5/4/25      PLAN:  - Diet: Regular   - Analgesia and antiemetics as needed  - Strict I&O's  - Monitor bowel function   - Incentive spirometry  - OOB as tolerated  - DVT prophylaxis: SCD, lovenox   - Monitor overnight  - Dispo: Discharge home in AM

## 2025-05-07 ENCOUNTER — APPOINTMENT (OUTPATIENT)
Dept: INTERNAL MEDICINE | Facility: CLINIC | Age: 45
End: 2025-05-07

## 2025-05-07 RX ORDER — LEVETIRACETAM 10 MG/ML
1 INJECTION, SOLUTION INTRAVENOUS
Refills: 0 | DISCHARGE

## 2025-05-21 ENCOUNTER — APPOINTMENT (OUTPATIENT)
Dept: INTERNAL MEDICINE | Facility: CLINIC | Age: 45
End: 2025-05-21

## 2025-05-21 ENCOUNTER — OUTPATIENT (OUTPATIENT)
Dept: OUTPATIENT SERVICES | Facility: HOSPITAL | Age: 45
LOS: 1 days | End: 2025-05-21
Payer: COMMERCIAL

## 2025-05-21 VITALS
SYSTOLIC BLOOD PRESSURE: 126 MMHG | HEART RATE: 88 BPM | BODY MASS INDEX: 44.1 KG/M2 | WEIGHT: 315 LBS | OXYGEN SATURATION: 96 % | HEIGHT: 71 IN | DIASTOLIC BLOOD PRESSURE: 96 MMHG

## 2025-05-21 DIAGNOSIS — E66.01 MORBID (SEVERE) OBESITY DUE TO EXCESS CALORIES: ICD-10-CM

## 2025-05-21 DIAGNOSIS — Z98.890 OTHER SPECIFIED POSTPROCEDURAL STATES: Chronic | ICD-10-CM

## 2025-05-21 DIAGNOSIS — I10 ESSENTIAL (PRIMARY) HYPERTENSION: ICD-10-CM

## 2025-05-21 DIAGNOSIS — R06.83 SNORING: ICD-10-CM

## 2025-05-21 DIAGNOSIS — Z76.89 PERSONS ENCOUNTERING HEALTH SERVICES IN OTHER SPECIFIED CIRCUMSTANCES: ICD-10-CM

## 2025-05-21 DIAGNOSIS — G47.33 OBSTRUCTIVE SLEEP APNEA (ADULT) (PEDIATRIC): ICD-10-CM

## 2025-05-21 DIAGNOSIS — Z00.00 ENCOUNTER FOR GENERAL ADULT MEDICAL EXAMINATION W/OUT ABNORMAL FINDINGS: ICD-10-CM

## 2025-05-21 PROBLEM — Z87.898 PERSONAL HISTORY OF OTHER SPECIFIED CONDITIONS: Chronic | Status: ACTIVE | Noted: 2025-05-04

## 2025-05-21 PROCEDURE — G2211 COMPLEX E/M VISIT ADD ON: CPT | Mod: NC

## 2025-05-21 PROCEDURE — 80053 COMPREHEN METABOLIC PANEL: CPT

## 2025-05-21 PROCEDURE — 99215 OFFICE O/P EST HI 40 MIN: CPT | Mod: GC

## 2025-05-21 PROCEDURE — 80061 LIPID PANEL: CPT

## 2025-05-21 PROCEDURE — 85025 COMPLETE CBC W/AUTO DIFF WBC: CPT

## 2025-05-21 PROCEDURE — 83036 HEMOGLOBIN GLYCOSYLATED A1C: CPT

## 2025-05-21 PROCEDURE — G0463: CPT

## 2025-05-22 LAB
ALBUMIN SERPL ELPH-MCNC: 4.3 G/DL
ALP BLD-CCNC: 68 U/L
ALT SERPL-CCNC: 35 U/L
ANION GAP SERPL CALC-SCNC: 13 MMOL/L
AST SERPL-CCNC: 25 U/L
BASOPHILS # BLD AUTO: 0.05 K/UL
BASOPHILS NFR BLD AUTO: 0.7 %
BILIRUB SERPL-MCNC: 0.3 MG/DL
BUN SERPL-MCNC: 12 MG/DL
CALCIUM SERPL-MCNC: 9.8 MG/DL
CHLORIDE SERPL-SCNC: 104 MMOL/L
CHOLEST SERPL-MCNC: 244 MG/DL
CO2 SERPL-SCNC: 21 MMOL/L
CREAT SERPL-MCNC: 0.91 MG/DL
EGFRCR SERPLBLD CKD-EPI 2021: 108 ML/MIN/1.73M2
EOSINOPHIL # BLD AUTO: 0.31 K/UL
EOSINOPHIL NFR BLD AUTO: 4.4 %
ESTIMATED AVERAGE GLUCOSE: 123 MG/DL
GLUCOSE SERPL-MCNC: 93 MG/DL
HBA1C MFR BLD HPLC: 5.9 %
HCT VFR BLD CALC: 43.1 %
HDLC SERPL-MCNC: 42 MG/DL
HGB BLD-MCNC: 13.7 G/DL
IMM GRANULOCYTES NFR BLD AUTO: 0.3 %
LDLC SERPL-MCNC: 158 MG/DL
LYMPHOCYTES # BLD AUTO: 3.08 K/UL
LYMPHOCYTES NFR BLD AUTO: 43.9 %
MAN DIFF?: NORMAL
MCHC RBC-ENTMCNC: 26.6 PG
MCHC RBC-ENTMCNC: 31.8 G/DL
MCV RBC AUTO: 83.7 FL
MONOCYTES # BLD AUTO: 0.53 K/UL
MONOCYTES NFR BLD AUTO: 7.6 %
NEUTROPHILS # BLD AUTO: 3.02 K/UL
NEUTROPHILS NFR BLD AUTO: 43.1 %
NONHDLC SERPL-MCNC: 201 MG/DL
PLATELET # BLD AUTO: 305 K/UL
POTASSIUM SERPL-SCNC: 4.9 MMOL/L
PROT SERPL-MCNC: 7 G/DL
RBC # BLD: 5.15 M/UL
RBC # FLD: 15.1 %
SODIUM SERPL-SCNC: 138 MMOL/L
TRIGL SERPL-MCNC: 236 MG/DL
WBC # FLD AUTO: 7.01 K/UL

## (undated) DEVICE — ELCTR GROUNDING PAD ADULT COVIDIEN

## (undated) DEVICE — IRRIGATOR BIO SHIELD

## (undated) DEVICE — TUBING SUCTION 20FT

## (undated) DEVICE — SUCTION YANKAUER NO CONTROL VENT

## (undated) DEVICE — CATH IV SAFE BC 22G X 1" (BLUE)

## (undated) DEVICE — TUBING IV SET GRAVITY 3Y 100" MACRO

## (undated) DEVICE — TUBING CAP SET ENDO 24HR USE GI

## (undated) DEVICE — TUBING SUCTION CONN 6FT STERILE

## (undated) DEVICE — POLY TRAP ETRAP

## (undated) DEVICE — SOL INJ NS 0.9% 500ML 2 PORT

## (undated) DEVICE — SENSOR O2 FINGER ADULT

## (undated) DEVICE — Device

## (undated) DEVICE — CLAMP BX HOT RAD JAW 3

## (undated) DEVICE — FORCEP RADIAL JAW 4 W NDL 2.4MM 2.8MM 240CM ORANGE DISP

## (undated) DEVICE — PACK IV START WITH CHG

## (undated) DEVICE — CATH IV SAFE BC 20G X 1.16" (PINK)

## (undated) DEVICE — FOLEY HOLDER STATLOCK 2 WAY ADULT